# Patient Record
Sex: FEMALE | Race: WHITE | NOT HISPANIC OR LATINO | Employment: OTHER | URBAN - METROPOLITAN AREA
[De-identification: names, ages, dates, MRNs, and addresses within clinical notes are randomized per-mention and may not be internally consistent; named-entity substitution may affect disease eponyms.]

---

## 2017-01-27 ENCOUNTER — TELEPHONE (OUTPATIENT)
Dept: INTERNAL MEDICINE | Facility: CLINIC | Age: 82
End: 2017-01-27

## 2017-01-27 RX ORDER — LIDOCAINE 50 MG/G
1 PATCH TOPICAL EVERY 24 HOURS
Qty: 30 PATCH | Refills: 5 | Status: SHIPPED | OUTPATIENT
Start: 2017-01-27 | End: 2017-06-30 | Stop reason: SDUPTHER

## 2017-01-27 RX ORDER — PANTOPRAZOLE SODIUM 40 MG/1
40 TABLET, DELAYED RELEASE ORAL DAILY
Qty: 90 TABLET | Refills: 1 | Status: SHIPPED | OUTPATIENT
Start: 2017-01-27 | End: 2017-08-08 | Stop reason: SDUPTHER

## 2017-01-27 NOTE — TELEPHONE ENCOUNTER
----- Message from Krysta Correa sent at 1/27/2017  2:52 PM EST -----  Patient called to get a refill of her pantoprazole 40mg and also wanted to let you know that her lidocaine patches need a PA. She uses [facility] in [place], [place]. Thanks!!

## 2017-01-27 NOTE — TELEPHONE ENCOUNTER
----- Message from Krzysztof English sent at 1/27/2017  3:18 PM EST -----  Contact: [facility]  SHE NEEDS A DX CODE FOR HER LITACAIN PATCH, PLEASE CALL [phone]

## 2017-02-08 ENCOUNTER — TELEPHONE (OUTPATIENT)
Dept: INTERNAL MEDICINE | Facility: CLINIC | Age: 82
End: 2017-02-08

## 2017-02-08 NOTE — TELEPHONE ENCOUNTER
Called pt to see if she has ever taken Neurontin in the past.  She says she has taken it but she does not do pills very well.  She prefers something that can be put directly on the area.  We did not get a letter of denial or approval on her Lidocaine.  She will fax us a copy of letter to see if we can do an appeal.

## 2017-02-08 NOTE — TELEPHONE ENCOUNTER
We did not receive a determination so I checked cover my meds and it says it was denied.  Would like to know if she could try something else.

## 2017-02-08 NOTE — TELEPHONE ENCOUNTER
----- Message from Krzysztof English sent at 2/7/2017 10:48 AM EST -----  Contact: PATIENT  WOULD LIKE A CALL BACK REGARDING PAIN PATCHES, SHE IS ALMOST OUT, SAYS SHE IS NEEDING TO KNOW THE STATUS OF THE AUTH. PLEASE CALL TO ADVISE.

## 2017-02-10 ENCOUNTER — TELEPHONE (OUTPATIENT)
Dept: INTERNAL MEDICINE | Facility: CLINIC | Age: 82
End: 2017-02-10

## 2017-02-10 NOTE — TELEPHONE ENCOUNTER
----- Message from Angela VU MD sent at 2/8/2017  5:00 PM EST -----  Regarding: RE: lidocaine patch  If not approved then to [facility]  ----- Message -----     From: Haley King     Sent: 2/8/2017   3:34 PM       To: Angela VU MD  Subject: RE: lidocaine patch                              Pt states she has tried in the past and does not do well on pills.  She prefers something that can go directly on the area.  She got the letter from in but I have not received one.  She will fax the letter to see if I can do an appeal  ----- Message -----     From: Angela VU MD     Sent: 2/8/2017  12:18 PM       To: Haley King  Subject: RE: lidocaine patch                              Has she tried Neurontin(gabapentin)?  ----- Message -----     From: Haley King     Sent: 2/8/2017   9:14 AM       To: Angela VU MD  Subject: lidocaine patch                                  Pt lidocain patches were denied.  She wants to know if she can try something else for her neuropathy (foot and back pain)

## 2017-02-10 NOTE — TELEPHONE ENCOUNTER
Will try 2% lidocaine jelly. Spoke to pharm and they said ins did approve .  It will cost pt $10.  Changed from 85 g to 90 mL which is 3 bottles of 30 mL.  Tried to call pt to let her know.  Was unable to reach her

## 2017-02-10 NOTE — TELEPHONE ENCOUNTER
----- Message from Angela VU MD sent at 2/8/2017  5:00 PM EST -----  Regarding: RE: lidocaine patch  If not approved then to [facility]  ----- Message -----     From: Haley King     Sent: 2/8/2017   3:34 PM       To: Angela VU MD  Subject: RE: lidocaine patch                              Pt states she has tried in the past and does not do well on pills.  She prefers something that can go directly on the area.  She got the letter from in but I have not received one.  She will fax the letter to see if I can do an appeal  ----- Message -----     From: Angela VU MD     Sent: 2/8/2017  12:18 PM       To: Haley King  Subject: RE: lidocaine patch                              Has she tried Neurontin(gabapentin)?  ----- Message -----     From: Haley King     Sent: 2/8/2017   9:14 AM       To: Angela VU MD  Subject: lidocaine patch                                  Pt lidocain patches were denied.  She wants to know if she can try something else for her neuropathy (foot and back pain)

## 2017-02-10 NOTE — TELEPHONE ENCOUNTER
----- Message from Beth Hernandez sent at 2/10/2017  9:41 AM EST -----  CALL PT BACK ABOUT PAIN PATCHES

## 2017-02-14 ENCOUNTER — TELEPHONE (OUTPATIENT)
Dept: INTERNAL MEDICINE | Facility: CLINIC | Age: 82
End: 2017-02-14

## 2017-02-14 NOTE — TELEPHONE ENCOUNTER
----- Message from Beth Hernandez sent at 2/14/2017  9:12 AM EST -----  PLEASE GIVE PT A CALL REGARDING PAIN PATCHES [phone]

## 2017-02-14 NOTE — TELEPHONE ENCOUNTER
Called pt back.  Explained to her that since ins would not cover the patches.  We sent in Lidocaine gel.  She said ok she would try it

## 2017-02-28 ENCOUNTER — TELEPHONE (OUTPATIENT)
Dept: INTERNAL MEDICINE | Facility: CLINIC | Age: 82
End: 2017-02-28

## 2017-02-28 RX ORDER — DOXYCYCLINE 100 MG/1
100 CAPSULE ORAL 2 TIMES DAILY
Qty: 20 CAPSULE | Refills: 0 | Status: SHIPPED | OUTPATIENT
Start: 2017-02-28 | End: 2017-03-30

## 2017-02-28 NOTE — TELEPHONE ENCOUNTER
Nothing out of the ordinary for her as far as fever or chills. She has the nausea and the low abdominal pain and pain with urination which the Azo does help a lot

## 2017-02-28 NOTE — TELEPHONE ENCOUNTER
----- Message from Angela VU MD sent at 2/28/2017  2:43 PM EST -----  Any fever or chills?  ----- Message -----     From: Emelyn Marte MA     Sent: 2/28/2017   2:34 PM       To: Angela VU MD    She has appt scheduled with you tomorrow. Do you want to start her on something or should she wait to see you? She has been taking Azo. But has a lot of discomfort.  ----- Message -----     From: Beth Hernandez     Sent: 2/28/2017   2:29 PM       To: Emelyn Marte MA    PT CALLED HAS UTI WANTS SOMETHING CALLED IN SHE HAS APPT WITH DR ALBERTINA REILLY. THE MEDICATION IS DOXYCYCLINE SENT TO [facility] EVERY TWELVE HRS 100MG TABLET

## 2017-02-28 NOTE — TELEPHONE ENCOUNTER
----- Message from Angela VU MD sent at 2/28/2017  3:12 PM EST -----  sent  ----- Message -----     From: Emelyn Marte MA     Sent: 2/28/2017   2:37 PM       To: Angela VU MD    See below. She call back and said she takes the Doxy BID for UTI's.  ----- Message -----     From: Bteh Hernandez     Sent: 2/28/2017   2:34 PM       To: Emelyn Marte MA    PT NEEDS TO TAKE UTI MED TWICE A DAY

## 2017-03-03 ENCOUNTER — OFFICE VISIT (OUTPATIENT)
Dept: INTERNAL MEDICINE | Facility: CLINIC | Age: 82
End: 2017-03-03

## 2017-03-03 VITALS
TEMPERATURE: 98.6 F | HEART RATE: 63 BPM | DIASTOLIC BLOOD PRESSURE: 82 MMHG | SYSTOLIC BLOOD PRESSURE: 132 MMHG | BODY MASS INDEX: 27.05 KG/M2 | WEIGHT: 147 LBS | HEIGHT: 62 IN | OXYGEN SATURATION: 97 %

## 2017-03-03 DIAGNOSIS — M54.41 CHRONIC MIDLINE LOW BACK PAIN WITH BILATERAL SCIATICA: ICD-10-CM

## 2017-03-03 DIAGNOSIS — E78.5 HYPERLIPIDEMIA, UNSPECIFIED HYPERLIPIDEMIA TYPE: ICD-10-CM

## 2017-03-03 DIAGNOSIS — R09.89 OTHER SPECIFIED SYMPTOMS AND SIGNS INVOLVING THE CIRCULATORY AND RESPIRATORY SYSTEMS: ICD-10-CM

## 2017-03-03 DIAGNOSIS — M79.671 BILATERAL FOOT PAIN: ICD-10-CM

## 2017-03-03 DIAGNOSIS — I25.10 ASCVD (ARTERIOSCLEROTIC CARDIOVASCULAR DISEASE): ICD-10-CM

## 2017-03-03 DIAGNOSIS — M79.672 BILATERAL FOOT PAIN: ICD-10-CM

## 2017-03-03 DIAGNOSIS — R35.0 FREQUENCY OF URINATION: Primary | ICD-10-CM

## 2017-03-03 DIAGNOSIS — N39.0 RECURRENT UTI: ICD-10-CM

## 2017-03-03 DIAGNOSIS — I10 ESSENTIAL HYPERTENSION: ICD-10-CM

## 2017-03-03 DIAGNOSIS — G89.29 CHRONIC MIDLINE LOW BACK PAIN WITH BILATERAL SCIATICA: ICD-10-CM

## 2017-03-03 DIAGNOSIS — M79.7 FIBROMYALGIA: ICD-10-CM

## 2017-03-03 DIAGNOSIS — L98.9 SKIN LESION OF BACK: ICD-10-CM

## 2017-03-03 DIAGNOSIS — E53.8 B12 DEFICIENCY: ICD-10-CM

## 2017-03-03 DIAGNOSIS — M54.42 CHRONIC MIDLINE LOW BACK PAIN WITH BILATERAL SCIATICA: ICD-10-CM

## 2017-03-03 DIAGNOSIS — H93.13 TINNITUS, BILATERAL: ICD-10-CM

## 2017-03-03 DIAGNOSIS — R39.15 URGENCY OF URINATION: ICD-10-CM

## 2017-03-03 LAB
ALBUMIN SERPL-MCNC: 4.3 G/DL (ref 3.2–4.8)
ALBUMIN/GLOB SERPL: 1 G/DL (ref 1.5–2.5)
ALP SERPL-CCNC: 115 U/L (ref 25–100)
ALT SERPL-CCNC: 20 U/L (ref 7–40)
AST SERPL-CCNC: 27 U/L (ref 0–33)
BASOPHILS # BLD AUTO: 0.03 10*3/MM3 (ref 0–0.2)
BASOPHILS NFR BLD AUTO: 0.4 % (ref 0–1)
BILIRUB BLD-MCNC: NEGATIVE MG/DL
BILIRUB SERPL-MCNC: 0.4 MG/DL (ref 0.3–1.2)
BUN SERPL-MCNC: 15 MG/DL (ref 9–23)
BUN/CREAT SERPL: 16.7 (ref 7–25)
CALCIUM SERPL-MCNC: 10.1 MG/DL (ref 8.7–10.4)
CHLORIDE SERPL-SCNC: 98 MMOL/L (ref 99–109)
CHOLEST SERPL-MCNC: 253 MG/DL (ref 0–200)
CLARITY, POC: CLEAR
CO2 SERPL-SCNC: 34 MMOL/L (ref 20–31)
COLOR UR: YELLOW
CREAT SERPL-MCNC: 0.9 MG/DL (ref 0.6–1.3)
EOSINOPHIL # BLD AUTO: 0.24 10*3/MM3 (ref 0.1–0.3)
EOSINOPHIL NFR BLD AUTO: 3.2 % (ref 0–3)
ERYTHROCYTE [DISTWIDTH] IN BLOOD BY AUTOMATED COUNT: 14.2 % (ref 11.3–14.5)
GLOBULIN SER CALC-MCNC: 4.1 GM/DL
GLUCOSE SERPL-MCNC: 88 MG/DL (ref 70–100)
GLUCOSE UR STRIP-MCNC: NEGATIVE MG/DL
HCT VFR BLD AUTO: 45.2 % (ref 34.5–44)
HDLC SERPL-MCNC: 65 MG/DL (ref 40–60)
HGB BLD-MCNC: 14.7 G/DL (ref 11.5–15.5)
IMM GRANULOCYTES # BLD: 0.02 10*3/MM3 (ref 0–0.03)
IMM GRANULOCYTES NFR BLD: 0.3 % (ref 0–0.6)
KETONES UR QL: NEGATIVE
LDLC SERPL CALC-MCNC: 167 MG/DL (ref 0–100)
LEUKOCYTE EST, POC: NEGATIVE
LYMPHOCYTES # BLD AUTO: 2.59 10*3/MM3 (ref 0.6–4.8)
LYMPHOCYTES NFR BLD AUTO: 34.1 % (ref 24–44)
MCH RBC QN AUTO: 30.8 PG (ref 27–31)
MCHC RBC AUTO-ENTMCNC: 32.5 G/DL (ref 32–36)
MCV RBC AUTO: 94.6 FL (ref 80–99)
MONOCYTES # BLD AUTO: 0.97 10*3/MM3 (ref 0–1)
MONOCYTES NFR BLD AUTO: 12.8 % (ref 0–12)
NEUTROPHILS # BLD AUTO: 3.74 10*3/MM3 (ref 1.5–8.3)
NEUTROPHILS NFR BLD AUTO: 49.2 % (ref 41–71)
NITRITE UR-MCNC: NEGATIVE MG/ML
PH UR: 6 [PH] (ref 5–8)
PLATELET # BLD AUTO: 302 10*3/MM3 (ref 150–450)
POTASSIUM SERPL-SCNC: 4.5 MMOL/L (ref 3.5–5.5)
PROT SERPL-MCNC: 8.4 G/DL (ref 5.7–8.2)
PROT UR STRIP-MCNC: NEGATIVE MG/DL
RBC # BLD AUTO: 4.78 10*6/MM3 (ref 3.89–5.14)
RBC # UR STRIP: NEGATIVE /UL
SODIUM SERPL-SCNC: 137 MMOL/L (ref 132–146)
SP GR UR: 1.02 (ref 1–1.03)
TRIGL SERPL-MCNC: 104 MG/DL (ref 0–150)
TSH SERPL DL<=0.005 MIU/L-ACNC: 4.2 MIU/ML (ref 0.35–5.35)
UROBILINOGEN UR QL: NORMAL
VIT B12 SERPL-MCNC: 968 PG/ML (ref 211–911)
VLDLC SERPL CALC-MCNC: 20.8 MG/DL
WBC # BLD AUTO: 7.59 10*3/MM3 (ref 3.5–10.8)

## 2017-03-03 PROCEDURE — 99214 OFFICE O/P EST MOD 30 MIN: CPT | Performed by: FAMILY MEDICINE

## 2017-03-03 PROCEDURE — 81003 URINALYSIS AUTO W/O SCOPE: CPT | Performed by: FAMILY MEDICINE

## 2017-03-03 PROCEDURE — 96372 THER/PROPH/DIAG INJ SC/IM: CPT | Performed by: FAMILY MEDICINE

## 2017-03-03 RX ORDER — TRAMADOL HYDROCHLORIDE 50 MG/1
50 TABLET ORAL EVERY 6 HOURS PRN
Qty: 120 TABLET | Refills: 1 | Status: SHIPPED | OUTPATIENT
Start: 2017-03-03 | End: 2017-05-04 | Stop reason: SDUPTHER

## 2017-03-03 RX ORDER — CYANOCOBALAMIN 1000 UG/ML
1000 INJECTION, SOLUTION INTRAMUSCULAR; SUBCUTANEOUS ONCE
Status: COMPLETED | OUTPATIENT
Start: 2017-03-03 | End: 2017-03-03

## 2017-03-03 RX ADMIN — CYANOCOBALAMIN 1000 MCG: 1000 INJECTION, SOLUTION INTRAMUSCULAR; SUBCUTANEOUS at 10:24
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
## 2017-03-06 DIAGNOSIS — R77.9 ELEVATED SERUM PROTEIN LEVEL: Primary | ICD-10-CM

## 2017-03-07 ENCOUNTER — TELEPHONE (OUTPATIENT)
Dept: INTERNAL MEDICINE | Facility: CLINIC | Age: 82
End: 2017-03-07

## 2017-03-07 NOTE — PROGRESS NOTES
Please call the patient regarding her abnormal result. Elevated protein, serum and urine protein electrophoresis  in computer to recheck nonfasting

## 2017-03-07 NOTE — TELEPHONE ENCOUNTER
----- Message from Angela VU MD sent at 3/6/2017  7:03 PM EST -----  Please call the patient regarding her abnormal result. Elevated protein, serum and urine protein electrophoresis  in computer to recheck nonfasting

## 2017-03-11 ENCOUNTER — RESULTS ENCOUNTER (OUTPATIENT)
Dept: INTERNAL MEDICINE | Facility: CLINIC | Age: 82
End: 2017-03-11

## 2017-03-11 DIAGNOSIS — R77.9 ELEVATED SERUM PROTEIN LEVEL: ICD-10-CM

## 2017-03-13 ENCOUNTER — APPOINTMENT (OUTPATIENT)
Dept: CARDIOLOGY | Facility: HOSPITAL | Age: 82
End: 2017-03-13

## 2017-03-27 ENCOUNTER — HOSPITAL ENCOUNTER (OUTPATIENT)
Dept: CARDIOLOGY | Facility: HOSPITAL | Age: 82
Discharge: HOME | End: 2017-03-27
Admitting: FAMILY MEDICINE

## 2017-03-27 DIAGNOSIS — R09.89 OTHER SPECIFIED SYMPTOMS AND SIGNS INVOLVING THE CIRCULATORY AND RESPIRATORY SYSTEMS: ICD-10-CM

## 2017-03-27 DIAGNOSIS — M79.671 BILATERAL FOOT PAIN: ICD-10-CM

## 2017-03-27 DIAGNOSIS — M79.672 BILATERAL FOOT PAIN: ICD-10-CM

## 2017-03-27 PROCEDURE — 93925 LOWER EXTREMITY STUDY: CPT

## 2017-03-27 PROCEDURE — 93925 LOWER EXTREMITY STUDY: CPT | Performed by: INTERNAL MEDICINE

## 2017-03-29 LAB
DIST POP A PSV LEFT: -333.5 CM/SEC
DIST POP A PSV RIGHT: -72.1 CM/SEC
DIST PTA PSV LEFT: -12.6 CM/SEC
DIST PTA PSV RIGHT: 132.8 CM/SEC
DIST SFA PSV LEFT: -125.1 CM/SEC
DIST SFA PSV RIGHT: -127.5 CM/SEC
LEFT ANT TIBIAL SYS PSV: 84 CM/S
LEFT CFA PROX SYS PSV: 123.4 CM/SEC
LEFT POPLITEAL PROX SYS PSV: 52 CM/S
LEFT POST TIBIAL SYS PSV: 21 CM/S
LEFT PROFUNDA SYS PSV: 43 CM/S
LEFT SUPER FEMORAL DIST SYS PSV: 124 CM/S
LEFT SUPER FEMORAL MID SYS PSV: 79 CM/S
LEFT SUPER FEMORAL PROX SYS PSV: 87 CM/S
LEFT TIB/PER TRUNK SYS PSV: 332 CM/S
MID PTA PSV LEFT: 20.2 CM/SEC
MID PTA PSV RIGHT: 75.9 CM/SEC
MID SFA PSV LEFT: -79.8 CM/SEC
MID SFA PSV RIGHT: -79.2 CM/SEC
PROX ATA PSV LEFT: 85.5 CM/SEC
PROX ATA PSV RIGHT: 30.9 CM/SEC
PROX PFA PSV LEFT: -46.4 CM/SEC
PROX PFA PSV RIGHT: -62.6 CM/SEC
PROX POP A PSV LEFT: 52.7 CM/SEC
PROX POP A PSV RIGHT: 118 CM/SEC
PROX PTA PSV LEFT: 21.6 CM/SEC
PROX PTA PSV RIGHT: 174.2 CM/SEC
PROX SFA PSV LEFT: -88 CM/SEC
PROX SFA PSV RIGHT: -75.2 CM/SEC
RIGHT ANT TIBEAL SYS PSV: 32 CM/S
RIGHT CFA PROX SYS PSV: 140.3 CM/SEC
RIGHT POPLITEAL DIST SYS PSV: 71 CM/S
RIGHT POPLITEAL PROX SYS PSV: 117 CM/S
RIGHT POST TIBIAL SYS PSV: 173 CM/S
RIGHT PROFUNDA SYS PSV: 62 CM/S
RIGHT SUPER FEMORAL DIST SYS PSV: 126 CM/S
RIGHT SUPER FEMORAL MID SYS PSV: 78 CM/S
RIGHT SUPER FEMORAL PROX SYS PSV: 75 CM/S
RIGHT TIB/PER TRUNK SYS PSV: 145 CM/S

## 2017-03-30 ENCOUNTER — OFFICE VISIT (OUTPATIENT)
Dept: CARDIOLOGY | Facility: CLINIC | Age: 82
End: 2017-03-30

## 2017-03-30 VITALS
DIASTOLIC BLOOD PRESSURE: 54 MMHG | HEIGHT: 62 IN | SYSTOLIC BLOOD PRESSURE: 96 MMHG | WEIGHT: 155.4 LBS | BODY MASS INDEX: 28.6 KG/M2 | HEART RATE: 64 BPM

## 2017-03-30 DIAGNOSIS — I25.10 CORONARY ARTERY DISEASE INVOLVING NATIVE CORONARY ARTERY OF NATIVE HEART WITHOUT ANGINA PECTORIS: Primary | ICD-10-CM

## 2017-03-30 DIAGNOSIS — E78.00 HYPERCHOLESTEROLEMIA: ICD-10-CM

## 2017-03-30 DIAGNOSIS — I10 ESSENTIAL HYPERTENSION: ICD-10-CM

## 2017-03-30 DIAGNOSIS — R60.0 BILATERAL EDEMA OF LOWER EXTREMITY: ICD-10-CM

## 2017-03-30 PROCEDURE — 99214 OFFICE O/P EST MOD 30 MIN: CPT | Performed by: INTERNAL MEDICINE

## 2017-03-30 NOTE — PROGRESS NOTES
Gaviota Evans  1935  [phone]    03/30/2017    Angela Sorto MD    Chief Complaint   Patient presents with   • Edema     PROBLEM LIST:  1. Coronary artery disease:  a. Left heart catheterization 12/10/2007, Dr. Grewal:  Status post 2.5 x 8 mm Micro  stent, 2.5 x 14 mm Micro  stent, and 2.5 x 15 mm Micro   stent placement to the LAD and OM1.    b. Repeat left heart catheterization 01/04/2008, Dr. Manley:  Status post 3.5 x 16 mm Liberté bare metal stent to RCA.  c. GXT Cardiolite 06/2010 Dr. Harris:  Patient did not reach target heart rate secondary  to not holding beta blocker, negative for ischemia.  d. Cardiac catheterization 03/01/2011, Dr. Goodwin:  LVEF of 55% to 60%, all 3 stents were found to be patent.  No territories appear to be a cause of ischemia.  Zebeta changed to Diltiazem 120  mg per day.  e. Echocardiogram, 03/01/2011:  Mild AI, trace MR, mild TR, LVEF of 55% to 60%.  f. Cardiac catheterization, 05/12/2015, secondary to recurrent angina; 90% and 60% sequential stenosis in the obtuse marginal branch reduced to 0 with a 2.5  x 23 mm and a 2.5 x 8 mm Xience Alpine drug-eluting stent by Dr. Cooney.  Non-flow-limiting disease of the LAD and diagonal branch (40%), 50% RCA, normal LVEF.  2. Palpitations:  a.  Event monitor, December 2011: Revealed brief episodes of atrial tachycardia.   3.  Hypertension.  4. Hyperlipidemia.  5. Obstructive sleep apnea with CPAP compliance.  6. Fibromyalgia.  7. Chronic back pain with intermittent epidural injections.  8. Lower extremity edema/discomfort:  a. Remote ROXANA:  right ROXANA 1.1, left ROXANA 1.1.  b. Bilateral lower extremity duplex, 3/27/2017: No significant right lower extremity atherosclerotic arterial disease. Mild left lower extremity atherosclerotic arterial disease.   9.  Status post cholecystectomy.    Allergies   Allergen Reactions   • Ambien [Zolpidem Tartrate]      amnestic   • Arthrotec  [Diclofenac-Misoprostol] GI Intolerance   • Aspirin GI Intolerance   • Bextra [Valdecoxib] GI Intolerance   • Codeine GI Intolerance   • Crestor [Rosuvastatin]      Rxn not known   • Darvon [Propoxyphene] GI Intolerance   • Effient [Prasugrel] Other (See Comments)     Tongue swelling   • Lescol [Fluvastatin]      Rxn not known   • Lexapro [Escitalopram Oxalate] Nausea Only   • Macrobid [Nitrofurantoin] Other (See Comments)     Pain/cramping   • Percocet [Oxycodone-Acetaminophen]      depression   • Pravachol [Pravastatin Sodium]      Rxn not known   • Vioxx [Rofecoxib] GI Intolerance   • Welchol [Colesevelam Hcl]      Joint pain   • Statins    • Lyrica [Pregabalin] GI Bleeding and GI Intolerance   • Plavix [Clopidogrel Bisulfate] Swelling     Tongue swelling   • Sulfamethoxazole-Trimethoprim Rash       Current Medications:      Current Outpatient Prescriptions:   •  aspirin 81 MG EC tablet, Take 81 mg by mouth daily., Disp: , Rfl:   •  bisoprolol-hydrochlorothiazide (ZIAC) 5-6.25 MG per tablet, Take 1 tablet by mouth daily., Disp: 90 tablet, Rfl: 2  •  citalopram (CeleXA) 20 MG tablet, Take 1 tablet by mouth Daily., Disp: 30 tablet, Rfl: 5  •  cyanocobalamin 1000 MCG/ML injection, Inject 1 mL into the shoulder, thigh, or buttocks Every 14 (Fourteen) Days., Disp: 6 mL, Rfl: 2  •  diclofenac (VOLTAREN) 75 MG EC tablet, Take 75 mg by mouth. Every 2-3 days prn pain, Disp: , Rfl:   •  fluconazole (DIFLUCAN) 150 MG tablet, Take 1 tablet by mouth 1 (one) time per week., Disp: , Rfl:   •  isosorbide mononitrate (IMDUR) 30 MG 24 hr tablet, Take 1 tablet by mouth daily., Disp: 90 tablet, Rfl: 1  •  lidocaine (LIDODERM) 5 %, Place 1 patch on the skin Daily. Remove & Discard patch within 12 hours or as directed by MD (Patient taking differently: Place 1 patch on the skin As Needed. Remove & Discard patch within 12 hours or as directed by MD), Disp: 30 patch, Rfl: 5  •  lidocaine (XYLOCAINE) 2 % jelly, Apply  topically Daily As  "Needed for mild pain (1-3). (Patient taking differently: Apply  topically As Needed for Mild Pain (1-3).), Disp: 85 g, Rfl: 0  •  nitroglycerin (NITROSTAT) 0.4 MG SL tablet, Place 1 tablet under the tongue as needed. Prn chest pain, Disp: , Rfl:   •  pantoprazole (PROTONIX) 40 MG EC tablet, Take 1 tablet by mouth Daily., Disp: 90 tablet, Rfl: 1  •  prednisoLONE acetate (PRED FORTE) 1 % ophthalmic suspension, Administer 1 drop into the left eye 2 (two) times a week., Disp: , Rfl:   •  traMADol (ULTRAM) 50 MG tablet, Take 1 tablet by mouth Every 6 (Six) Hours As Needed for moderate pain (4-6)., Disp: 120 tablet, Rfl: 1  •  Zinc 50 MG tablet, Take 1 tablet by mouth As Needed., Disp: , Rfl:     HPI    Ms. Evans presents today for 6 month follow up of CAD, lower extremity edema, palpitations, hypertension, and hyperlipidemia. Since last visit, she continues to experience worsening lower extremity edema bilaterally as well as associated lower extremity pain. Her edema is worse at night than in the morning. Patient denies chest pain, palpitations, shortness of breath, PND, orthopnea, dizziness, and syncope. She also complains of back pain secondary to fibromyalgia.     The following portions of the patient's history were reviewed and updated as appropriate: allergies, current medications and problem list.    Pertinent positives as listed in the HPI.  All other systems reviewed are negative.    Vitals:    03/30/17 1439   BP: 96/54   BP Location: Right arm   Patient Position: Sitting   Pulse: 64   Weight: 155 lb 6.4 oz (70.5 kg)   Height: 62\" (157.5 cm)       General: Alert and oriented  Neck: Jugular venous pressure is within normal limits. Carotids have normal upstrokes without bruits.   Cardiovascular: Heart has a nondisplaced focal PMI. Regular rate and rhythm without murmur, gallop or rub.  Lungs: Clear without rales or wheezes. Equal expansion is noted.   Extremities: Show 1+ edema.  Skin: warm and dry.  Neurologic: " nonfocal    Diagnostic Data:    Lab Results   Component Value Date    TRIG 104 03/03/2017    HDL 65 (H) 03/03/2017    LDLDIRECT 137 (H) 05/12/2015    AST 27 03/03/2017    ALT 20 03/03/2017     Lab Results   Component Value Date    GLUCOSE 77 08/22/2016    BUN 15 03/03/2017    CREATININE 0.90 03/03/2017    EGFRIFNONA 60 (L) 03/03/2017    EGFRIFAFRI 73 03/03/2017    BCR 16.7 03/03/2017    K 4.5 03/03/2017    CO2 34.0 (H) 03/03/2017    CALCIUM 10.1 03/03/2017    PROTENTOTREF 8.4 (H) 03/03/2017    ALBUMIN 4.30 03/03/2017    LABIL2 1.0 (L) 03/03/2017    AST 27 03/03/2017    ALT 20 03/03/2017     Lab Results   Component Value Date    WBC 7.59 03/03/2017    HGB 14.7 03/03/2017    HCT 45.2 (H) 03/03/2017    MCV 94.6 03/03/2017     03/03/2017     Procedures    Assessment:      ICD-10-CM ICD-9-CM   1. Coronary artery disease involving native coronary artery of native heart without angina pectoris I25.10 414.01   2. Bilateral edema of lower extremity R60.0 782.3   3. Essential hypertension I10 401.9   4. Hypercholesterolemia E78.00 272.0       Plan:    1. Start Maxzide 37.5/25 mg once daily, AM.    2. BMP in 2 weeks with Dr. Sorto.   3. Continue current medications.  4. F/up in 2 months or sooner if needed.    Scribed for Susan Goodwin MD by Ana Delcid. 3/30/2017  2:56 PM    I Susan Goodwin MD personally performed the services described in this documentation as scribed by the above individual in my presence, and it is both accurate and complete.    Susan Goodwin MD, [facility]

## 2017-05-04 ENCOUNTER — TELEPHONE (OUTPATIENT)
Dept: INTERNAL MEDICINE | Facility: CLINIC | Age: 82
End: 2017-05-04

## 2017-05-04 DIAGNOSIS — M54.41 CHRONIC MIDLINE LOW BACK PAIN WITH BILATERAL SCIATICA: ICD-10-CM

## 2017-05-04 DIAGNOSIS — G89.29 CHRONIC MIDLINE LOW BACK PAIN WITH BILATERAL SCIATICA: ICD-10-CM

## 2017-05-04 DIAGNOSIS — M54.42 CHRONIC MIDLINE LOW BACK PAIN WITH BILATERAL SCIATICA: ICD-10-CM

## 2017-05-04 RX ORDER — TRAMADOL HYDROCHLORIDE 50 MG/1
50 TABLET ORAL EVERY 6 HOURS PRN
Qty: 120 TABLET | Refills: 0 | Status: SHIPPED | OUTPATIENT
Start: 2017-05-04 | End: 2017-06-30 | Stop reason: SDUPTHER

## 2017-06-09 RX ORDER — BISOPROLOL FUMARATE AND HYDROCHLOROTHIAZIDE 5; 6.25 MG/1; MG/1
1 TABLET ORAL DAILY
Qty: 90 TABLET | Refills: 2 | Status: SHIPPED | OUTPATIENT
Start: 2017-06-09 | End: 2017-10-04 | Stop reason: SDUPTHER

## 2017-06-26 ENCOUNTER — TELEPHONE (OUTPATIENT)
Dept: INTERNAL MEDICINE | Facility: CLINIC | Age: 82
End: 2017-06-26

## 2017-06-30 ENCOUNTER — OFFICE VISIT (OUTPATIENT)
Dept: INTERNAL MEDICINE | Facility: CLINIC | Age: 82
End: 2017-06-30

## 2017-06-30 ENCOUNTER — TELEPHONE (OUTPATIENT)
Dept: INTERNAL MEDICINE | Facility: CLINIC | Age: 82
End: 2017-06-30

## 2017-06-30 VITALS
SYSTOLIC BLOOD PRESSURE: 112 MMHG | DIASTOLIC BLOOD PRESSURE: 60 MMHG | WEIGHT: 149 LBS | TEMPERATURE: 97.6 F | OXYGEN SATURATION: 95 % | HEIGHT: 62 IN | HEART RATE: 63 BPM | BODY MASS INDEX: 27.42 KG/M2

## 2017-06-30 DIAGNOSIS — G89.29 CHRONIC MIDLINE LOW BACK PAIN WITH BILATERAL SCIATICA: ICD-10-CM

## 2017-06-30 DIAGNOSIS — E78.5 HYPERLIPIDEMIA, UNSPECIFIED HYPERLIPIDEMIA TYPE: Primary | ICD-10-CM

## 2017-06-30 DIAGNOSIS — M54.41 CHRONIC MIDLINE LOW BACK PAIN WITH BILATERAL SCIATICA: ICD-10-CM

## 2017-06-30 DIAGNOSIS — I10 ESSENTIAL HYPERTENSION: ICD-10-CM

## 2017-06-30 DIAGNOSIS — M54.42 CHRONIC MIDLINE LOW BACK PAIN WITH BILATERAL SCIATICA: ICD-10-CM

## 2017-06-30 DIAGNOSIS — I25.10 CARDIOVASCULAR DISEASE: ICD-10-CM

## 2017-06-30 PROCEDURE — 99214 OFFICE O/P EST MOD 30 MIN: CPT | Performed by: FAMILY MEDICINE

## 2017-06-30 RX ORDER — TRAMADOL HYDROCHLORIDE 50 MG/1
50 TABLET ORAL EVERY 6 HOURS PRN
Qty: 120 TABLET | Refills: 0 | Status: SHIPPED | OUTPATIENT
Start: 2017-06-30 | End: 2017-08-17 | Stop reason: SDUPTHER

## 2017-06-30 RX ORDER — CITALOPRAM 20 MG/1
20 TABLET ORAL DAILY
Qty: 30 TABLET | Refills: 5 | Status: SHIPPED | OUTPATIENT
Start: 2017-06-30 | End: 2018-02-23 | Stop reason: SDUPTHER

## 2017-06-30 RX ORDER — CYANOCOBALAMIN 1000 UG/ML
1000 INJECTION, SOLUTION INTRAMUSCULAR; SUBCUTANEOUS
Qty: 6 ML | Refills: 2 | Status: SHIPPED | OUTPATIENT
Start: 2017-06-30 | End: 2017-09-29 | Stop reason: SDUPTHER

## 2017-06-30 RX ORDER — LIDOCAINE 50 MG/G
1 PATCH TOPICAL EVERY 24 HOURS
Qty: 30 PATCH | Refills: 5 | Status: SHIPPED | OUTPATIENT
Start: 2017-06-30 | End: 2017-09-29 | Stop reason: SDUPTHER

## 2017-06-30 NOTE — PROGRESS NOTES
"Trisha Evans is a 81 y.o. female.     Chief Complaint   Patient presents with   • Hyperlipidemia   • Hypertension   • Med Refill       Visit Vitals   • /60   • Pulse 63   • Temp 97.6 °F (36.4 °C)   • Ht 62\" (157.5 cm)   • Wt 149 lb (67.6 kg)   • LMP  (LMP Unknown)   • SpO2 95%   • BMI 27.25 kg/m2       Hyperlipidemia   This is a chronic problem. The current episode started more than 1 year ago. The problem is uncontrolled. Recent lipid tests were reviewed and are high. She has no history of diabetes, hypothyroidism, liver disease, obesity or nephrotic syndrome. There are no known factors aggravating her hyperlipidemia. Associated symptoms include chest pain, leg pain and myalgias. Pertinent negatives include no focal sensory loss, focal weakness or shortness of breath. Current antihyperlipidemic treatment includes diet change. The current treatment provides mild improvement of lipids. There are no compliance problems.  Risk factors for coronary artery disease include dyslipidemia, hypertension and post-menopausal.   Hypertension   This is a chronic problem. The current episode started more than 1 year ago. The problem is unchanged. The problem is controlled. Associated symptoms include chest pain and malaise/fatigue. Pertinent negatives include no anxiety, blurred vision, headaches, neck pain, orthopnea, palpitations, peripheral edema, PND, shortness of breath or sweats. There are no associated agents to hypertension. Risk factors for coronary artery disease include dyslipidemia, post-menopausal state and sedentary lifestyle. Past treatments include angiotensin blockers and diuretics. The current treatment provides significant improvement. There are no compliance problems.  Hypertensive end-organ damage includes angina. There is no history of kidney disease, CAD/MI, CVA, heart failure, left ventricular hypertrophy, PVD or retinopathy.   Coronary Artery Disease   Presents for follow-up visit. " Symptoms include chest pain and chest pressure. Pertinent negatives include no chest tightness, dizziness, leg swelling, muscle weakness, palpitations, shortness of breath or weight gain. Risk factors include hyperlipidemia. Risk factors do not include obesity. The symptoms have been stable. Compliance with diet is good. Compliance with exercise is good. Compliance with medications is poor.        The following portions of the patient's history were reviewed and updated as appropriate: allergies, current medications, past family history, past medical history, past social history, past surgical history and problem list.    Past Medical History:   Diagnosis Date   • Atherosclerosis of abdominal aorta    • Atrophic vaginitis    • B12 deficiency    • Rivera's esophagus    • Cardiovascular disease    • Chronic back pain     Chronic back pain with intermittent epidural injections.   • Chronic low back pain    • Coronary artery disease    • Fibromyalgia    • Fibromyalgia    • GERD (gastroesophageal reflux disease)    • H/O mammogram 12/2014   • Hammer toe    • Hiatal hernia    • Hyperlipidemia    • Hypertension    • Lower extremity pain     Lower extremity discomfort: Right ROXANA 1.1, left ROXANA 1.1.   • Normocytic anemia due to blood loss    • Obstructive sleep apnea    • Obstructive sleep apnea treated with continuous positive airway pressure (CPAP)     Obstructive sleep apnea with CPAP compliance.   • Osteoarthritis     severe   • Palpitations     Event monitor, December 2011: Revealed brief episodes of atrial tachycardia.    • Senile osteoporosis        Past Surgical History:   Procedure Laterality Date   • BLADDER REPAIR     • CHOLECYSTECTOMY      Status post cholecystectomy.   • COLONOSCOPY  01/2015   • CORONARY STENT PLACEMENT  05/2015    2 stents 80% RCA 12/2007, C 2/2011. no critical disease   • HYSTERECTOMY      partial   • LUMBAR FUSION  2006    L4-s1   • OTHER SURGICAL HISTORY      [facility]   • VAGINAL REPAIR          Allergies   Allergen Reactions   • Ambien [Zolpidem Tartrate]      amnestic   • Arthrotec [Diclofenac-Misoprostol] GI Intolerance   • Aspirin GI Intolerance   • Bextra [Valdecoxib] GI Intolerance   • Codeine GI Intolerance   • Crestor [Rosuvastatin]      Rxn not known   • Darvon [Propoxyphene] GI Intolerance   • Effient [Prasugrel] Other (See Comments)     Tongue swelling   • Lescol [Fluvastatin]      Rxn not known   • Lexapro [Escitalopram Oxalate] Nausea Only   • Macrobid [Nitrofurantoin] Other (See Comments)     Pain/cramping   • Percocet [Oxycodone-Acetaminophen]      depression   • Pravachol [Pravastatin Sodium]      Rxn not known   • Vioxx [Rofecoxib] GI Intolerance   • Welchol [Colesevelam Hcl]      Joint pain   • Statins    • Lyrica [Pregabalin] GI Bleeding and GI Intolerance   • Plavix [Clopidogrel Bisulfate] Swelling     Tongue swelling   • Sulfamethoxazole-Trimethoprim Rash       Family History   Problem Relation Age of Onset   • No Known Problems Sister    • Heart disease Sister    • Heart attack Sister 54   • Coronary artery disease Sister    • Hypertension Mother        Social History     Social History   • Marital status:      Spouse name: N/A   • Number of children: N/A   • Years of education: N/A     Occupational History   • Not on file.     Social History Main Topics   • Smoking status: Never Smoker   • Smokeless tobacco: Never Used   • Alcohol use Yes      Comment: rarely micaela   • Drug use: No   • Sexual activity: No     Other Topics Concern   • Not on file     Social History Narrative       Review of Systems   Constitutional: Positive for fatigue and malaise/fatigue. Negative for chills, diaphoresis, fever and weight gain.   HENT: Positive for hearing loss and tinnitus. Negative for ear pain, nosebleeds, postnasal drip, rhinorrhea, sinus pressure, sneezing and sore throat.    Eyes: Negative.  Negative for blurred vision, redness and itching.   Respiratory: Negative.  Negative  for cough, chest tightness, shortness of breath and wheezing.    Cardiovascular: Positive for chest pain. Negative for palpitations, orthopnea, leg swelling and PND.        Very mild chest pain since stents   Gastrointestinal: Positive for constipation and diarrhea. Negative for abdominal pain, nausea and vomiting.   Endocrine: Negative.  Negative for cold intolerance and heat intolerance.   Genitourinary: Positive for urgency. Negative for dysuria, frequency and hematuria.   Musculoskeletal: Positive for myalgias. Negative for arthralgias, back pain, muscle weakness and neck pain.   Skin: Negative.  Negative for color change and rash.   Allergic/Immunologic: Negative.  Negative for environmental allergies.   Neurological: Positive for weakness and light-headedness. Negative for dizziness, focal weakness, syncope and headaches.   Hematological: Negative.  Negative for adenopathy. Does not bruise/bleed easily.   Psychiatric/Behavioral: Positive for dysphoric mood. The patient is not nervous/anxious.         More depressed recently, since allergies keeping her inside.       Objective   Physical Exam   Constitutional: She is oriented to person, place, and time. She appears well-developed.   HENT:   Head: Normocephalic.   Right Ear: External ear normal.   Left Ear: External ear normal.   Nose: Nose normal.   Eyes: Conjunctivae and EOM are normal. Pupils are equal, round, and reactive to light.   Neck: Normal range of motion. Neck supple. Carotid bruit is not present. No thyroid mass and no thyromegaly present.   Cardiovascular: Normal rate and regular rhythm.    No murmur heard.  Pulmonary/Chest: Effort normal and breath sounds normal. No respiratory distress. She has no decreased breath sounds. She has no wheezes. She has no rhonchi. She has no rales.   Abdominal: Soft. Bowel sounds are normal. There is no tenderness.   Musculoskeletal: Normal range of motion. She exhibits no edema.   Neurological: She is alert and  oriented to person, place, and time.   Skin: Skin is warm and dry.   Psychiatric: She has a normal mood and affect. Her behavior is normal.   Nursing note and vitals reviewed.      Assessment/Plan   Gaviota was seen today for hyperlipidemia, hypertension and med refill.    Diagnoses and all orders for this visit:    Hyperlipidemia, unspecified hyperlipidemia type    Chronic midline low back pain with bilateral sciatica  -     traMADol (ULTRAM) 50 MG tablet; Take 1 tablet by mouth Every 6 (Six) Hours As Needed for Moderate Pain (4-6).    Essential hypertension    Cardiovascular disease    Other orders  -     cyanocobalamin 1000 MCG/ML injection; Inject 1 mL into the shoulder, thigh, or buttocks Every 14 (Fourteen) Days.  -     citalopram (CeleXA) 20 MG tablet; Take 1 tablet by mouth Daily.  -     lidocaine (LIDODERM) 5 %; Place 1 patch on the skin Daily. Remove & Discard patch within 12 hours or as directed by MD  -     Cyanocobalamin (NASCOBAL) 500 MCG/0.1ML solution; 1 dose into each nostril 2 (Two) Times a Week.      Refill on the muscle relaxer gel called to [facility] on [address]  The compounded gel works better than lidoderm.              Current Outpatient Prescriptions:   •  aspirin 81 MG EC tablet, Take 81 mg by mouth daily., Disp: , Rfl:   •  bisoprolol-hydrochlorothiazide (ZIAC) 5-6.25 MG per tablet, Take 1 tablet by mouth Daily., Disp: 90 tablet, Rfl: 2  •  citalopram (CeleXA) 20 MG tablet, Take 1 tablet by mouth Daily., Disp: 30 tablet, Rfl: 5  •  cyanocobalamin 1000 MCG/ML injection, Inject 1 mL into the shoulder, thigh, or buttocks Every 14 (Fourteen) Days., Disp: 6 mL, Rfl: 2  •  diclofenac (VOLTAREN) 75 MG EC tablet, Take 75 mg by mouth. Every 2-3 days prn pain, Disp: , Rfl:   •  isosorbide mononitrate (IMDUR) 30 MG 24 hr tablet, Take 1 tablet by mouth daily., Disp: 90 tablet, Rfl: 1  •  lidocaine (XYLOCAINE) 2 % jelly, Apply  topically Daily As Needed for mild pain (1-3). (Patient taking  differently: Apply  topically As Needed for Mild Pain (1-3).), Disp: 85 g, Rfl: 0  •  nitroglycerin (NITROSTAT) 0.4 MG SL tablet, Place 1 tablet under the tongue as needed. Prn chest pain, Disp: , Rfl:   •  pantoprazole (PROTONIX) 40 MG EC tablet, Take 1 tablet by mouth Daily., Disp: 90 tablet, Rfl: 1  •  prednisoLONE acetate (PRED FORTE) 1 % ophthalmic suspension, Administer 1 drop into the left eye 2 (two) times a week., Disp: , Rfl:   •  traMADol (ULTRAM) 50 MG tablet, Take 1 tablet by mouth Every 6 (Six) Hours As Needed for Moderate Pain (4-6)., Disp: 120 tablet, Rfl: 0  •  Zinc 50 MG tablet, Take 1 tablet by mouth As Needed., Disp: , Rfl:   •  [START ON 7/3/2017] Cyanocobalamin (NASCOBAL) 500 MCG/0.1ML solution, 1 dose into each nostril 2 (Two) Times a Week., Disp: 8 bottle, Rfl: 3  •  lidocaine (LIDODERM) 5 %, Place 1 patch on the skin Daily. Remove & Discard patch within 12 hours or as directed by MD, Disp: 30 patch, Rfl: 5    Return in about 3 months (around 9/30/2017), or if symptoms worsen or fail to improve, for Recheck.

## 2017-06-30 NOTE — TELEPHONE ENCOUNTER
----- Message from Angela VU MD sent at 6/30/2017 11:22 AM EDT -----  Regarding: refill  Pt needs refill of muscle relaxer gel at [facility]

## 2017-07-07 ENCOUNTER — OFFICE VISIT (OUTPATIENT)
Dept: INTERNAL MEDICINE | Facility: CLINIC | Age: 82
End: 2017-07-07

## 2017-07-07 VITALS
BODY MASS INDEX: 27.49 KG/M2 | HEART RATE: 71 BPM | DIASTOLIC BLOOD PRESSURE: 60 MMHG | OXYGEN SATURATION: 94 % | SYSTOLIC BLOOD PRESSURE: 120 MMHG | WEIGHT: 149.4 LBS | HEIGHT: 62 IN | TEMPERATURE: 98.7 F

## 2017-07-07 DIAGNOSIS — N39.0 URINARY TRACT INFECTION WITH HEMATURIA, SITE UNSPECIFIED: ICD-10-CM

## 2017-07-07 DIAGNOSIS — R31.9 URINARY TRACT INFECTION WITH HEMATURIA, SITE UNSPECIFIED: ICD-10-CM

## 2017-07-07 DIAGNOSIS — R10.9 FLANK PAIN: Primary | ICD-10-CM

## 2017-07-07 DIAGNOSIS — R39.15 URGENCY OF URINATION: ICD-10-CM

## 2017-07-07 DIAGNOSIS — Z13.9 SCREENING: ICD-10-CM

## 2017-07-07 LAB
BILIRUB BLD-MCNC: ABNORMAL MG/DL
CLARITY, POC: CLEAR
COLOR UR: ABNORMAL
GLUCOSE UR STRIP-MCNC: NEGATIVE MG/DL
KETONES UR QL: NEGATIVE
LEUKOCYTE EST, POC: ABNORMAL
NITRITE UR-MCNC: POSITIVE MG/ML
PH UR: 6 [PH] (ref 5–8)
PROT UR STRIP-MCNC: NEGATIVE MG/DL
RBC # UR STRIP: ABNORMAL /UL
SP GR UR: 1.01 (ref 1–1.03)
UROBILINOGEN UR QL: ABNORMAL

## 2017-07-07 PROCEDURE — 99213 OFFICE O/P EST LOW 20 MIN: CPT | Performed by: FAMILY MEDICINE

## 2017-07-07 PROCEDURE — 81003 URINALYSIS AUTO W/O SCOPE: CPT | Performed by: FAMILY MEDICINE

## 2017-07-07 RX ORDER — CEFUROXIME AXETIL 250 MG/1
250 TABLET ORAL 2 TIMES DAILY
Qty: 20 TABLET | Refills: 0 | Status: SHIPPED | OUTPATIENT
Start: 2017-07-07 | End: 2017-09-29

## 2017-07-07 NOTE — PROGRESS NOTES
"Trisha Evans is a 81 y.o. female.     Chief Complaint   Patient presents with   • Recurrent Urinary Tract Infection     Used AZO home UTI test.    • Flank Pain     2-3 days   • Abdominal Pain   • Urinary Urgency       Visit Vitals   • /60   • Pulse 71   • Temp 98.7 °F (37.1 °C)   • Ht 62\" (157.5 cm)   • Wt 149 lb 6.4 oz (67.8 kg)   • LMP  (LMP Unknown)   • SpO2 94%   • BMI 27.33 kg/m2       Flank Pain   This is a new problem. The current episode started in the past 7 days. The quality of the pain is described as stabbing. The pain is at a severity of 10/10. Associated symptoms include abdominal pain, bladder incontinence and dysuria. Pertinent negatives include no bowel incontinence, chest pain, fever, headaches, leg pain, numbness, paresis, paresthesias, pelvic pain, perianal numbness, tingling, weakness or weight loss. Risk factors include menopause. Treatments tried: AZO. The treatment provided moderate relief.   Abdominal Pain   This is a new problem. The current episode started in the past 7 days. The onset quality is undetermined. The problem occurs intermittently. The problem has been waxing and waning. The pain is located in the suprapubic region (low back). The pain is at a severity of 5/10. The pain is moderate. The quality of the pain is aching and cramping. The abdominal pain does not radiate. Associated symptoms include dysuria, frequency and hematuria. Pertinent negatives include no anorexia, arthralgias, belching, constipation, diarrhea, fever, flatus, headaches, hematochezia, melena, myalgias, nausea, vomiting or weight loss. Nothing aggravates the pain. Relieved by: AZO. The treatment provided mild relief. There is no history of abdominal surgery, colon cancer, Crohn's disease, gallstones, GERD, irritable bowel syndrome, pancreatitis, PUD or ulcerative colitis.   Cystitis   This is a recurrent problem. The current episode started in the past 7 days. The problem occurs " constantly. The problem has been unchanged. Associated symptoms include abdominal pain, a change in bowel habit and urinary symptoms. Pertinent negatives include no anorexia, arthralgias, chest pain, chills, congestion, coughing, diaphoresis, fatigue, fever, headaches, joint swelling, myalgias, nausea, neck pain, numbness, rash, sore throat, swollen glands, vertigo, visual change, vomiting or weakness. Nothing aggravates the symptoms. Treatments tried: AZO. The treatment provided mild relief.     Pt does not want a colonoscopy, and does not want cologard. She wants to check her stool.    The following portions of the patient's history were reviewed and updated as appropriate: allergies, current medications, past family history, past medical history, past social history, past surgical history and problem list.    Past Medical History:   Diagnosis Date   • Atherosclerosis of abdominal aorta    • Atrophic vaginitis    • B12 deficiency    • Rivera's esophagus    • Cardiovascular disease    • Chronic back pain     Chronic back pain with intermittent epidural injections.   • Chronic low back pain    • Coronary artery disease    • Fibromyalgia    • Fibromyalgia    • GERD (gastroesophageal reflux disease)    • H/O mammogram 12/2014   • Hammer toe    • Hiatal hernia    • Hyperlipidemia    • Hypertension    • Lower extremity pain     Lower extremity discomfort: Right ROXANA 1.1, left ROXANA 1.1.   • Normocytic anemia due to blood loss    • Obstructive sleep apnea    • Obstructive sleep apnea treated with continuous positive airway pressure (CPAP)     Obstructive sleep apnea with CPAP compliance.   • Osteoarthritis     severe   • Palpitations     Event monitor, December 2011: Revealed brief episodes of atrial tachycardia.    • Senile osteoporosis        Past Surgical History:   Procedure Laterality Date   • BLADDER REPAIR     • CHOLECYSTECTOMY      Status post cholecystectomy.   • COLONOSCOPY  01/2015   • CORONARY STENT PLACEMENT   05/2015    2 stents 80% RCA 12/2007, [facility] 2/2011. no critical disease   • HYSTERECTOMY      partial   • LUMBAR FUSION  2006    L4-s1   • OTHER SURGICAL HISTORY      [facility]   • VAGINAL REPAIR         Allergies   Allergen Reactions   • Ambien [Zolpidem Tartrate]      amnestic   • Arthrotec [Diclofenac-Misoprostol] GI Intolerance   • Aspirin GI Intolerance   • Bextra [Valdecoxib] GI Intolerance   • Codeine GI Intolerance   • Crestor [Rosuvastatin]      Rxn not known   • Darvon [Propoxyphene] GI Intolerance   • Effient [Prasugrel] Other (See Comments)     Tongue swelling   • Lescol [Fluvastatin]      Rxn not known   • Lexapro [Escitalopram Oxalate] Nausea Only   • Macrobid [Nitrofurantoin] Other (See Comments)     Pain/cramping   • Percocet [Oxycodone-Acetaminophen]      depression   • Pravachol [Pravastatin Sodium]      Rxn not known   • Vioxx [Rofecoxib] GI Intolerance   • Welchol [Colesevelam Hcl]      Joint pain   • Statins    • Ciprofloxacin    • Lyrica [Pregabalin] GI Bleeding and GI Intolerance   • Plavix [Clopidogrel Bisulfate] Swelling     Tongue swelling   • Sulfamethoxazole-Trimethoprim Rash     Past Surgical History:   Procedure Laterality Date   • BLADDER REPAIR     • CHOLECYSTECTOMY      Status post cholecystectomy.   • COLONOSCOPY  01/2015   • CORONARY STENT PLACEMENT  05/2015    2 stents 80% RCA 12/2007, [facility] 2/2011. no critical disease   • HYSTERECTOMY      partial   • LUMBAR FUSION  2006    L4-s1   • OTHER SURGICAL HISTORY      [facility]   • VAGINAL REPAIR         Allergies   Allergen Reactions   • Ambien [Zolpidem Tartrate]      amnestic   • Arthrotec [Diclofenac-Misoprostol] GI Intolerance   • Aspirin GI Intolerance   • Bextra [Valdecoxib] GI Intolerance   • Codeine GI Intolerance   • Crestor [Rosuvastatin]      Rxn not known   • Darvon [Propoxyphene] GI Intolerance   • Effient [Prasugrel] Other (See Comments)     Tongue swelling   • Lescol [Fluvastatin]      Rxn not known   • Lexapro [Escitalopram Oxalate]  Nausea Only   • Macrobid [Nitrofurantoin] Other (See Comments)     Pain/cramping   • Percocet [Oxycodone-Acetaminophen]      depression   • Pravachol [Pravastatin Sodium]      Rxn not known   • Vioxx [Rofecoxib] GI Intolerance   • Welchol [Colesevelam Hcl]      Joint pain   • Statins    • Ciprofloxacin    • Lyrica [Pregabalin] GI Bleeding and GI Intolerance   • Plavix [Clopidogrel Bisulfate] Swelling     Tongue swelling   • Sulfamethoxazole-Trimethoprim Rash       Family History   Problem Relation Age of Onset   • No Known Problems Sister    • Heart disease Sister    • Heart attack Sister 54   • Coronary artery disease Sister    • Hypertension Mother        Social History     Social History   • Marital status:      Spouse name: N/A   • Number of children: N/A   • Years of education: N/A     Occupational History   • Not on file.     Social History Main Topics   • Smoking status: Never Smoker   • Smokeless tobacco: Never Used   • Alcohol use Yes      Comment: rarely micaela   • Drug use: No   • Sexual activity: No     Other Topics Concern   • Not on file     Social History Narrative       Review of Systems   Constitutional: Negative.  Negative for chills, diaphoresis, fatigue, fever and weight loss.   HENT: Positive for rhinorrhea. Negative for congestion, ear pain, nosebleeds, postnasal drip, sinus pressure, sneezing and sore throat.    Eyes: Negative.  Negative for redness and itching.   Respiratory: Negative.  Negative for cough, shortness of breath and wheezing.    Cardiovascular: Negative.  Negative for chest pain and palpitations.   Gastrointestinal: Positive for abdominal pain and change in bowel habit. Negative for anorexia, bowel incontinence, constipation, diarrhea, flatus, hematochezia, melena, nausea and vomiting.   Endocrine: Negative.  Negative for cold intolerance and heat intolerance.   Genitourinary: Positive for bladder incontinence, dysuria, flank pain, frequency, hematuria and urgency.  Negative for pelvic pain.   Musculoskeletal: Positive for back pain. Negative for arthralgias, joint swelling, myalgias and neck pain.   Skin: Negative.  Negative for color change and rash.   Allergic/Immunologic: Positive for environmental allergies.   Neurological: Negative.  Negative for dizziness, vertigo, tingling, syncope, weakness, light-headedness, numbness, headaches and paresthesias.   Hematological: Negative.  Negative for adenopathy. Does not bruise/bleed easily.   Psychiatric/Behavioral: Negative.  Negative for dysphoric mood. The patient is not nervous/anxious.        Objective   Physical Exam   Constitutional: She is oriented to person, place, and time. She appears well-developed.   HENT:   Head: Normocephalic.   Right Ear: External ear normal.   Left Ear: External ear normal.   Nose: Nose normal.   Eyes: Conjunctivae and EOM are normal. Pupils are equal, round, and reactive to light.   Neck: Normal range of motion. Neck supple.   Cardiovascular: Normal rate and regular rhythm.    No murmur heard.  Pulmonary/Chest: Effort normal and breath sounds normal. No respiratory distress. She has no decreased breath sounds. She has no wheezes. She has no rhonchi. She has no rales.   Abdominal: Soft. Bowel sounds are normal. There is tenderness in the suprapubic area. There is CVA tenderness. There is no rebound and no guarding.   Musculoskeletal: Normal range of motion.   Neurological: She is alert and oriented to person, place, and time.   Skin: Skin is warm and dry.   Psychiatric: She has a normal mood and affect. Her behavior is normal.   Nursing note and vitals reviewed.      Assessment/Plan   Gaviota was seen today for recurrent urinary tract infection, flank pain, abdominal pain and urinary urgency.    Diagnoses and all orders for this visit:    Flank pain  -     POCT urinalysis dipstick, automated    Urgency of urination  -     POCT urinalysis dipstick, automated    Urinary tract infection with hematuria,  site unspecified  -     Urine Culture    Screening  -     Cancel: POCT FECAL OCCULT BLOOD BY IMMUNOASSAY  -     POCT FECAL OCCULT BLOOD BY IMMUNOASSAY; Future    Other orders  -     cefuroxime (CEFTIN) 250 MG tablet; Take 1 tablet by mouth 2 (Two) Times a Day.                   Current Outpatient Prescriptions:   •  aspirin 81 MG EC tablet, Take 81 mg by mouth daily., Disp: , Rfl:   •  bisoprolol-hydrochlorothiazide (ZIAC) 5-6.25 MG per tablet, Take 1 tablet by mouth Daily., Disp: 90 tablet, Rfl: 2  •  citalopram (CeleXA) 20 MG tablet, Take 1 tablet by mouth Daily., Disp: 30 tablet, Rfl: 5  •  Cyanocobalamin (NASCOBAL) 500 MCG/0.1ML solution, 1 dose into each nostril 2 (Two) Times a Week., Disp: 8 bottle, Rfl: 3  •  cyanocobalamin 1000 MCG/ML injection, Inject 1 mL into the shoulder, thigh, or buttocks Every 14 (Fourteen) Days., Disp: 6 mL, Rfl: 2  •  diclofenac (VOLTAREN) 75 MG EC tablet, Take 75 mg by mouth. Every 2-3 days prn pain, Disp: , Rfl:   •  isosorbide mononitrate (IMDUR) 30 MG 24 hr tablet, Take 1 tablet by mouth daily., Disp: 90 tablet, Rfl: 1  •  lidocaine (LIDODERM) 5 %, Place 1 patch on the skin Daily. Remove & Discard patch within 12 hours or as directed by MD, Disp: 30 patch, Rfl: 5  •  lidocaine (XYLOCAINE) 2 % jelly, Apply  topically Daily As Needed for mild pain (1-3). (Patient taking differently: Apply  topically As Needed for Mild Pain (1-3).), Disp: 85 g, Rfl: 0  •  Magnesium Chloride powder 15 %, GuaiFENesin powder 10 %, Baclofen powder 5 %, Cyclobenzaprine powder 4 %, Apply small amount to affected area every 2 t0 3 hours as needed until you notice improvement, then apply as needed., Disp: 60 each, Rfl: 11  •  nitroglycerin (NITROSTAT) 0.4 MG SL tablet, Place 1 tablet under the tongue as needed. Prn chest pain, Disp: , Rfl:   •  pantoprazole (PROTONIX) 40 MG EC tablet, Take 1 tablet by mouth Daily., Disp: 90 tablet, Rfl: 1  •  prednisoLONE acetate (PRED FORTE) 1 % ophthalmic suspension,  Administer 1 drop into the left eye 2 (two) times a week., Disp: , Rfl:   •  traMADol (ULTRAM) 50 MG tablet, Take 1 tablet by mouth Every 6 (Six) Hours As Needed for Moderate Pain (4-6)., Disp: 120 tablet, Rfl: 0  •  Zinc 50 MG tablet, Take 1 tablet by mouth As Needed., Disp: , Rfl:   •  cefuroxime (CEFTIN) 250 MG tablet, Take 1 tablet by mouth 2 (Two) Times a Day., Disp: 20 tablet, Rfl: 0    Return in about 3 months (around 10/7/2017), or if symptoms worsen or fail to improve, for Recheck, Next scheduled follow up.    Recent Results (from the past 168 hour(s))   POCT urinalysis dipstick, automated    Collection Time: 07/07/17  1:15 PM   Result Value Ref Range    Color Orange (A) Yellow, Straw, Dark Yellow, Glo    Clarity, UA Clear Clear    Glucose, UA Negative Negative, 1000 mg/dL (3+) mg/dL    Bilirubin Moderate (2+) (A) Negative    Ketones, UA Negative Negative    Specific Gravity  1.010 1.005 - 1.030    Blood, UA Small (A) Negative    pH, Urine 6.0 5.0 - 8.0    Protein, POC Negative Negative mg/dL    Urobilinogen, UA (A) Normal    Leukocytes Moderate (2+) (A) Negative    Nitrite, UA Positive (A) Negative

## 2017-07-09 LAB
BACTERIA UR CULT: NORMAL
BACTERIA UR CULT: NORMAL

## 2017-08-08 ENCOUNTER — TELEPHONE (OUTPATIENT)
Dept: INTERNAL MEDICINE | Facility: CLINIC | Age: 82
End: 2017-08-08

## 2017-08-08 RX ORDER — PANTOPRAZOLE SODIUM 40 MG/1
40 TABLET, DELAYED RELEASE ORAL DAILY
Qty: 90 TABLET | Refills: 1 | Status: SHIPPED | OUTPATIENT
Start: 2017-08-08 | End: 2017-09-29 | Stop reason: SDUPTHER

## 2017-08-17 ENCOUNTER — TELEPHONE (OUTPATIENT)
Dept: INTERNAL MEDICINE | Facility: CLINIC | Age: 82
End: 2017-08-17

## 2017-08-17 DIAGNOSIS — G89.29 CHRONIC MIDLINE LOW BACK PAIN WITH BILATERAL SCIATICA: ICD-10-CM

## 2017-08-17 DIAGNOSIS — M54.42 CHRONIC MIDLINE LOW BACK PAIN WITH BILATERAL SCIATICA: ICD-10-CM

## 2017-08-17 DIAGNOSIS — M54.41 CHRONIC MIDLINE LOW BACK PAIN WITH BILATERAL SCIATICA: ICD-10-CM

## 2017-08-17 RX ORDER — TRAMADOL HYDROCHLORIDE 50 MG/1
50 TABLET ORAL EVERY 6 HOURS PRN
Qty: 120 TABLET | Refills: 0 | Status: SHIPPED | OUTPATIENT
Start: 2017-08-17 | End: 2017-09-29 | Stop reason: SDUPTHER

## 2017-09-29 ENCOUNTER — OFFICE VISIT (OUTPATIENT)
Dept: INTERNAL MEDICINE | Facility: CLINIC | Age: 82
End: 2017-09-29

## 2017-09-29 ENCOUNTER — TELEPHONE (OUTPATIENT)
Dept: INTERNAL MEDICINE | Facility: CLINIC | Age: 82
End: 2017-09-29

## 2017-09-29 VITALS
OXYGEN SATURATION: 96 % | HEART RATE: 65 BPM | SYSTOLIC BLOOD PRESSURE: 116 MMHG | TEMPERATURE: 97.5 F | DIASTOLIC BLOOD PRESSURE: 64 MMHG | BODY MASS INDEX: 27.33 KG/M2 | WEIGHT: 149.4 LBS

## 2017-09-29 DIAGNOSIS — K22.70 BARRETT'S ESOPHAGUS WITHOUT DYSPLASIA: ICD-10-CM

## 2017-09-29 DIAGNOSIS — M54.41 CHRONIC MIDLINE LOW BACK PAIN WITH BILATERAL SCIATICA: Primary | ICD-10-CM

## 2017-09-29 DIAGNOSIS — K21.00 GASTROESOPHAGEAL REFLUX DISEASE WITH ESOPHAGITIS: ICD-10-CM

## 2017-09-29 DIAGNOSIS — E53.8 B12 DEFICIENCY: ICD-10-CM

## 2017-09-29 DIAGNOSIS — M79.2 PERIPHERAL NEUROPATHIC PAIN: ICD-10-CM

## 2017-09-29 DIAGNOSIS — E78.5 HYPERLIPIDEMIA, UNSPECIFIED HYPERLIPIDEMIA TYPE: ICD-10-CM

## 2017-09-29 DIAGNOSIS — M54.42 CHRONIC MIDLINE LOW BACK PAIN WITH BILATERAL SCIATICA: Primary | ICD-10-CM

## 2017-09-29 DIAGNOSIS — R10.13 EPIGASTRIC PAIN: ICD-10-CM

## 2017-09-29 DIAGNOSIS — G89.29 CHRONIC MIDLINE LOW BACK PAIN WITH BILATERAL SCIATICA: Primary | ICD-10-CM

## 2017-09-29 PROCEDURE — 99214 OFFICE O/P EST MOD 30 MIN: CPT | Performed by: FAMILY MEDICINE

## 2017-09-29 RX ORDER — PANTOPRAZOLE SODIUM 40 MG/1
40 TABLET, DELAYED RELEASE ORAL DAILY
Qty: 90 TABLET | Refills: 1 | Status: SHIPPED | OUTPATIENT
Start: 2017-09-29 | End: 2018-02-23 | Stop reason: SDUPTHER

## 2017-09-29 RX ORDER — TRAMADOL HYDROCHLORIDE 50 MG/1
50 TABLET ORAL EVERY 6 HOURS PRN
Qty: 120 TABLET | Refills: 0 | Status: SHIPPED | OUTPATIENT
Start: 2017-09-29 | End: 2017-12-08 | Stop reason: SDUPTHER

## 2017-09-29 RX ORDER — LIDOCAINE 50 MG/G
1 PATCH TOPICAL EVERY 24 HOURS
Qty: 30 PATCH | Refills: 5 | Status: SHIPPED | OUTPATIENT
Start: 2017-09-29 | End: 2017-12-08 | Stop reason: SDUPTHER

## 2017-09-29 RX ORDER — AMOXICILLIN AND CLAVULANATE POTASSIUM 875; 125 MG/1; MG/1
1 TABLET, FILM COATED ORAL 2 TIMES DAILY
COMMUNITY
End: 2017-12-08

## 2017-09-29 RX ORDER — CYANOCOBALAMIN 1000 UG/ML
1000 INJECTION, SOLUTION INTRAMUSCULAR; SUBCUTANEOUS
Qty: 6 ML | Refills: 2 | Status: SHIPPED | OUTPATIENT
Start: 2017-09-29 | End: 2019-02-04 | Stop reason: SDUPTHER

## 2017-09-29 RX ORDER — AMOXICILLIN AND CLAVULANATE POTASSIUM 875; 125 MG/1; MG/1
TABLET, FILM COATED ORAL
COMMUNITY
Start: 2017-09-25 | End: 2017-10-04

## 2017-09-29 NOTE — PROGRESS NOTES
Subjective   Gaviota Evans is a 82 y.o. female.     Chief Complaint   Patient presents with   • Med Management     3 month follow up   • Fibromyalgia     acting up; flare ups with the weather changes       Visit Vitals   • /64   • Pulse 65   • Temp 97.5 °F (36.4 °C)   • Wt 149 lb 6.4 oz (67.8 kg)   • LMP  (LMP Unknown)   • SpO2 96%   • BMI 27.33 kg/m2       Fibromyalgia   This is a chronic problem. The current episode started more than 1 year ago. The problem occurs constantly. The problem has been gradually worsening (worse with weather change). Associated symptoms include abdominal pain, anorexia and myalgias. Pertinent negatives include no arthralgias, change in bowel habit, chest pain, chills, congestion, coughing, diaphoresis, fatigue, fever, headaches, joint swelling, nausea, neck pain, rash, sore throat, swollen glands, urinary symptoms, vertigo, visual change, vomiting or weakness. Exacerbated by: weather changes. She has tried oral narcotics and NSAIDs for the symptoms. The treatment provided moderate relief.   Abdominal Pain   This is a recurrent problem. The current episode started more than 1 month ago. The problem occurs intermittently. The problem has been gradually worsening. The pain is located in the epigastric region. The pain is at a severity of 7/10. The pain is moderate. The quality of the pain is a sensation of fullness. Associated symptoms include anorexia and myalgias. Pertinent negatives include no arthralgias, belching, constipation, diarrhea, dysuria, fever, flatus, frequency, headaches, hematochezia, hematuria, melena, nausea, vomiting or weight loss. Nothing aggravates the pain. The pain is relieved by certain positions. She has tried nothing for the symptoms. The treatment provided no relief.      Pt went to GYN (Dr Erickson) and had UTI 9/25/17 and is on augmentin.  Pt has low back pain and has had surgery on back.   Pt occasionally cannot walk upright.   Pt has numbness and  pain both feet.   Pt is due to see Cardiology next week. Pt is not tolerating any statins    The following portions of the patient's history were reviewed and updated as appropriate: allergies, current medications, past family history, past medical history, past social history, past surgical history and problem list.    Past Medical History:   Diagnosis Date   • Atherosclerosis of abdominal aorta    • Atrophic vaginitis    • B12 deficiency    • Rivera's esophagus    • Cardiovascular disease    • Chronic back pain     Chronic back pain with intermittent epidural injections.   • Chronic low back pain    • Coronary artery disease    • Fibromyalgia    • Fibromyalgia    • GERD (gastroesophageal reflux disease)    • H/O mammogram 12/2014   • Hammer toe    • Hiatal hernia    • Hyperlipidemia    • Hypertension    • Lower extremity pain     Lower extremity discomfort: Right ROXANA 1.1, left ROXANA 1.1.   • Normocytic anemia due to blood loss    • Obstructive sleep apnea    • Obstructive sleep apnea treated with continuous positive airway pressure (CPAP)     Obstructive sleep apnea with CPAP compliance.   • Osteoarthritis     severe   • Palpitations     Event monitor, December 2011: Revealed brief episodes of atrial tachycardia.    • Senile osteoporosis        Past Surgical History:   Procedure Laterality Date   • BLADDER REPAIR     • CHOLECYSTECTOMY      Status post cholecystectomy.   • COLONOSCOPY  01/2015   • CORONARY STENT PLACEMENT  05/2015    2 stents 80% RCA 12/2007, LHC 2/2011. no critical disease   • HYSTERECTOMY      partial   • LUMBAR FUSION  2006    L4-s1   • OTHER SURGICAL HISTORY      [facility]   • VAGINAL REPAIR       Allergies   Allergen Reactions   • Ambien [Zolpidem Tartrate]      amnestic   • Arthrotec [Diclofenac-Misoprostol] GI Intolerance   • Aspirin GI Intolerance   • Bextra [Valdecoxib] GI Intolerance   • Codeine GI Intolerance   • Crestor [Rosuvastatin]      Rxn not known   • Darvon [Propoxyphene] GI Intolerance    • Effient [Prasugrel] Other (See Comments)     Tongue swelling   • Lescol [Fluvastatin]      Rxn not known   • Lexapro [Escitalopram Oxalate] Nausea Only   • Macrobid [Nitrofurantoin] Other (See Comments)     Pain/cramping   • Percocet [Oxycodone-Acetaminophen]      depression   • Pravachol [Pravastatin Sodium]      Rxn not known   • Vioxx [Rofecoxib] GI Intolerance   • Welchol [Colesevelam Hcl]      Joint pain   • Statins    • Ciprofloxacin    • Lyrica [Pregabalin] GI Bleeding and GI Intolerance   • Plavix [Clopidogrel Bisulfate] Swelling     Tongue swelling   • Sulfamethoxazole-Trimethoprim Rash       Family History   Problem Relation Age of Onset   • No Known Problems Sister    • Heart disease Sister    • Heart attack Sister 54   • Coronary artery disease Sister    • Hypertension Mother      Social History     Social History   • Marital status:      Spouse name: N/A   • Number of children: N/A   • Years of education: N/A     Occupational History   • Not on file.     Social History Main Topics   • Smoking status: Never Smoker   • Smokeless tobacco: Never Used   • Alcohol use Yes      Comment: rarely micaela   • Drug use: No   • Sexual activity: No     Other Topics Concern   • Not on file     Social History Narrative       Review of Systems   Constitutional: Negative.  Negative for chills, diaphoresis, fatigue, fever and weight loss.   HENT: Negative.  Negative for congestion, ear pain, nosebleeds, postnasal drip, rhinorrhea, sinus pressure, sneezing and sore throat.    Eyes: Negative.  Negative for redness and itching.   Respiratory: Negative.  Negative for cough, shortness of breath and wheezing.    Cardiovascular: Negative.  Negative for chest pain and palpitations.   Gastrointestinal: Positive for abdominal pain and anorexia. Negative for change in bowel habit, constipation, diarrhea, flatus, hematochezia, melena, nausea and vomiting.   Endocrine: Negative.  Negative for cold intolerance and heat  intolerance.   Genitourinary: Negative.  Negative for dysuria, frequency, hematuria and urgency.   Musculoskeletal: Positive for myalgias. Negative for arthralgias, back pain, joint swelling and neck pain.   Skin: Negative.  Negative for color change and rash.   Allergic/Immunologic: Negative.  Negative for environmental allergies.   Neurological: Negative.  Negative for dizziness, vertigo, syncope, weakness, light-headedness and headaches.   Hematological: Negative.  Negative for adenopathy. Does not bruise/bleed easily.   Psychiatric/Behavioral: Negative.  Negative for dysphoric mood. The patient is not nervous/anxious.        Objective   Physical Exam   Constitutional: She is oriented to person, place, and time. She appears well-developed.   HENT:   Head: Normocephalic.   Right Ear: External ear normal.   Left Ear: External ear normal.   Nose: Nose normal.   Eyes: Conjunctivae and EOM are normal. Pupils are equal, round, and reactive to light.   Neck: Normal range of motion. Neck supple.   Cardiovascular: Normal rate and regular rhythm.    No murmur heard.  Pulmonary/Chest: Effort normal and breath sounds normal.   Abdominal: Soft. Bowel sounds are normal. There is no hepatosplenomegaly. There is tenderness in the right upper quadrant and epigastric area.   Musculoskeletal: Normal range of motion.      During the foot exam she had a monofilament test performed (decreased sensation).    Vascular Status -  Her exam exhibits right foot vasculature normal. Her exam exhibits no right foot edema. Her exam exhibits left foot vasculature normal. Her exam exhibits no left foot edema.   Skin Integrity  -  Her right foot skin is intact.     Gaviota 's left foot skin is intact. .  Neurological: She is alert and oriented to person, place, and time.   Skin: Skin is warm and dry.   Psychiatric: She has a normal mood and affect. Her behavior is normal.   Nursing note and vitals reviewed.      Assessment/Plan   Gaviota was seen today  for med management and fibromyalgia.    Diagnoses and all orders for this visit:    Chronic midline low back pain with bilateral sciatica  -     lidocaine (LIDODERM) 5 %; Place 1 patch on the skin Daily. Remove & Discard patch within 12 hours or as directed by MD for back pain and neuropathy  -     traMADol (ULTRAM) 50 MG tablet; Take 1 tablet by mouth Every 6 (Six) Hours As Needed for Moderate Pain .    Peripheral neuropathic pain  -     lidocaine (LIDODERM) 5 %; Place 1 patch on the skin Daily. Remove & Discard patch within 12 hours or as directed by MD for back pain and neuropathy  -     Folate  -     Vitamin B1, Whole Blood    B12 deficiency  -     cyanocobalamin 1000 MCG/ML injection; Inject 1 mL into the shoulder, thigh, or buttocks Every 14 (Fourteen) Days.  -     Vitamin B12    Rivera's esophagus without dysplasia  -     pantoprazole (PROTONIX) 40 MG EC tablet; Take 1 tablet by mouth Daily.    Gastroesophageal reflux disease with esophagitis  -     pantoprazole (PROTONIX) 40 MG EC tablet; Take 1 tablet by mouth Daily.    Epigastric pain  -     US abdomen limited; Future  -     Comprehensive Metabolic Panel  -     CBC & Differential  -     Amylase  -     Lipase    Hyperlipidemia, unspecified hyperlipidemia type  -     Lipid Panel             pt will get flu shot after getting off of antibiotics      Current Outpatient Prescriptions:   •  amoxicillin-clavulanate (AUGMENTIN) 875-125 MG per tablet, , Disp: , Rfl:   •  amoxicillin-clavulanate (AUGMENTIN) 875-125 MG per tablet, Take 1 tablet by mouth 2 (Two) Times a Day., Disp: , Rfl:   •  aspirin 81 MG EC tablet, Take 81 mg by mouth daily., Disp: , Rfl:   •  bisoprolol-hydrochlorothiazide (ZIAC) 5-6.25 MG per tablet, Take 1 tablet by mouth Daily., Disp: 90 tablet, Rfl: 2  •  citalopram (CeleXA) 20 MG tablet, Take 1 tablet by mouth Daily., Disp: 30 tablet, Rfl: 5  •  Cyanocobalamin (NASCOBAL) 500 MCG/0.1ML solution, 1 dose into each nostril 2 (Two) Times a Week.,  Disp: 8 bottle, Rfl: 3  •  cyanocobalamin 1000 MCG/ML injection, Inject 1 mL into the shoulder, thigh, or buttocks Every 14 (Fourteen) Days., Disp: 6 mL, Rfl: 2  •  diclofenac (VOLTAREN) 75 MG EC tablet, Take 75 mg by mouth. Every 2-3 days prn pain, Disp: , Rfl:   •  isosorbide mononitrate (IMDUR) 30 MG 24 hr tablet, Take 1 tablet by mouth daily., Disp: 90 tablet, Rfl: 1  •  lidocaine (LIDODERM) 5 %, Place 1 patch on the skin Daily. Remove & Discard patch within 12 hours or as directed by MD for back pain and neuropathy, Disp: 30 patch, Rfl: 5  •  lidocaine (XYLOCAINE) 2 % jelly, Apply  topically Daily As Needed for mild pain (1-3). (Patient taking differently: Apply  topically As Needed for Mild Pain (1-3).), Disp: 85 g, Rfl: 0  •  Magnesium Chloride powder 15 %, GuaiFENesin powder 10 %, Baclofen powder 5 %, Cyclobenzaprine powder 4 %, Apply small amount to affected area every 2 t0 3 hours as needed until you notice improvement, then apply as needed., Disp: 60 each, Rfl: 11  •  nitroglycerin (NITROSTAT) 0.4 MG SL tablet, Place 1 tablet under the tongue as needed. Prn chest pain, Disp: , Rfl:   •  pantoprazole (PROTONIX) 40 MG EC tablet, Take 1 tablet by mouth Daily., Disp: 90 tablet, Rfl: 1  •  prednisoLONE acetate (PRED FORTE) 1 % ophthalmic suspension, Administer 1 drop into the left eye 2 (two) times a week., Disp: , Rfl:   •  traMADol (ULTRAM) 50 MG tablet, Take 1 tablet by mouth Every 6 (Six) Hours As Needed for Moderate Pain ., Disp: 120 tablet, Rfl: 0  •  Zinc 50 MG tablet, Take 1 tablet by mouth As Needed., Disp: , Rfl:     Return in about 4 weeks (around 10/27/2017), or if symptoms worsen or fail to improve, for Recheck.

## 2017-10-03 LAB
ALBUMIN SERPL-MCNC: 4.1 G/DL (ref 3.2–4.8)
ALBUMIN/GLOB SERPL: 1.2 G/DL (ref 1.5–2.5)
ALP SERPL-CCNC: 107 U/L (ref 25–100)
ALT SERPL-CCNC: 17 U/L (ref 7–40)
AMYLASE SERPL-CCNC: 49 U/L (ref 30–118)
AST SERPL-CCNC: 19 U/L (ref 0–33)
BASOPHILS # BLD AUTO: 0.02 10*3/MM3 (ref 0–0.2)
BASOPHILS NFR BLD AUTO: 0.3 % (ref 0–1)
BILIRUB SERPL-MCNC: 0.3 MG/DL (ref 0.3–1.2)
BUN SERPL-MCNC: 19 MG/DL (ref 9–23)
BUN/CREAT SERPL: 19 (ref 7–25)
CALCIUM SERPL-MCNC: 9.5 MG/DL (ref 8.7–10.4)
CHLORIDE SERPL-SCNC: 99 MMOL/L (ref 99–109)
CHOLEST SERPL-MCNC: 232 MG/DL (ref 0–200)
CO2 SERPL-SCNC: 29 MMOL/L (ref 20–31)
CREAT SERPL-MCNC: 1 MG/DL (ref 0.6–1.3)
EOSINOPHIL # BLD AUTO: 0.29 10*3/MM3 (ref 0–0.3)
EOSINOPHIL NFR BLD AUTO: 4 % (ref 0–3)
ERYTHROCYTE [DISTWIDTH] IN BLOOD BY AUTOMATED COUNT: 15.4 % (ref 11.3–14.5)
FOLATE SERPL-MCNC: 7.08 NG/ML (ref 3.2–20)
GLOBULIN SER CALC-MCNC: 3.5 GM/DL
GLUCOSE SERPL-MCNC: 96 MG/DL (ref 70–100)
HCT VFR BLD AUTO: 41.8 % (ref 34.5–44)
HDLC SERPL-MCNC: 58 MG/DL (ref 40–60)
HGB BLD-MCNC: 13.4 G/DL (ref 11.5–15.5)
IMM GRANULOCYTES # BLD: 0.01 10*3/MM3 (ref 0–0.03)
IMM GRANULOCYTES NFR BLD: 0.1 % (ref 0–0.6)
LDLC SERPL CALC-MCNC: 156 MG/DL (ref 0–100)
LIPASE SERPL-CCNC: 28 U/L (ref 6–51)
LYMPHOCYTES # BLD AUTO: 2.44 10*3/MM3 (ref 0.6–4.8)
LYMPHOCYTES NFR BLD AUTO: 33.3 % (ref 24–44)
MCH RBC QN AUTO: 28 PG (ref 27–31)
MCHC RBC AUTO-ENTMCNC: 32.1 G/DL (ref 32–36)
MCV RBC AUTO: 87.4 FL (ref 80–99)
MONOCYTES # BLD AUTO: 1.1 10*3/MM3 (ref 0–1)
MONOCYTES NFR BLD AUTO: 15 % (ref 0–12)
NEUTROPHILS # BLD AUTO: 3.46 10*3/MM3 (ref 1.5–8.3)
NEUTROPHILS NFR BLD AUTO: 47.3 % (ref 41–71)
PLATELET # BLD AUTO: 282 10*3/MM3 (ref 150–450)
POTASSIUM SERPL-SCNC: 4 MMOL/L (ref 3.5–5.5)
PROT SERPL-MCNC: 7.6 G/DL (ref 5.7–8.2)
RBC # BLD AUTO: 4.78 10*6/MM3 (ref 3.89–5.14)
SODIUM SERPL-SCNC: 138 MMOL/L (ref 132–146)
TRIGL SERPL-MCNC: 88 MG/DL (ref 0–150)
VIT B1 BLD-SCNC: 144.6 NMOL/L (ref 66.5–200)
VIT B12 SERPL-MCNC: 1888 PG/ML (ref 211–911)
VLDLC SERPL CALC-MCNC: 17.6 MG/DL
WBC # BLD AUTO: 7.32 10*3/MM3 (ref 3.5–10.8)

## 2017-10-04 ENCOUNTER — TELEPHONE (OUTPATIENT)
Dept: INTERNAL MEDICINE | Facility: CLINIC | Age: 82
End: 2017-10-04

## 2017-10-04 ENCOUNTER — OFFICE VISIT (OUTPATIENT)
Dept: CARDIOLOGY | Facility: CLINIC | Age: 82
End: 2017-10-04

## 2017-10-04 ENCOUNTER — HOSPITAL ENCOUNTER (OUTPATIENT)
Dept: ULTRASOUND IMAGING | Facility: HOSPITAL | Age: 82
Discharge: HOME | End: 2017-10-04
Admitting: FAMILY MEDICINE

## 2017-10-04 VITALS
DIASTOLIC BLOOD PRESSURE: 68 MMHG | OXYGEN SATURATION: 96 % | HEART RATE: 77 BPM | SYSTOLIC BLOOD PRESSURE: 150 MMHG | WEIGHT: 153.2 LBS | HEIGHT: 62 IN | BODY MASS INDEX: 28.19 KG/M2

## 2017-10-04 DIAGNOSIS — R10.13 EPIGASTRIC PAIN: ICD-10-CM

## 2017-10-04 DIAGNOSIS — E78.5 DYSLIPIDEMIA: ICD-10-CM

## 2017-10-04 DIAGNOSIS — I10 ESSENTIAL HYPERTENSION: ICD-10-CM

## 2017-10-04 DIAGNOSIS — R00.2 PALPITATIONS: ICD-10-CM

## 2017-10-04 DIAGNOSIS — I25.10 CORONARY ARTERY DISEASE INVOLVING NATIVE CORONARY ARTERY OF NATIVE HEART WITHOUT ANGINA PECTORIS: Primary | ICD-10-CM

## 2017-10-04 PROCEDURE — 99214 OFFICE O/P EST MOD 30 MIN: CPT | Performed by: INTERNAL MEDICINE

## 2017-10-04 PROCEDURE — 76705 ECHO EXAM OF ABDOMEN: CPT

## 2017-10-04 RX ORDER — VITAMIN E 268 MG
400 CAPSULE ORAL DAILY
COMMUNITY
End: 2020-04-14

## 2017-10-04 RX ORDER — NITROGLYCERIN 0.4 MG/1
0.4 TABLET SUBLINGUAL AS NEEDED
Qty: 25 TABLET | Refills: 11 | Status: SHIPPED | OUTPATIENT
Start: 2017-10-04 | End: 2018-11-26 | Stop reason: SDUPTHER

## 2017-10-04 RX ORDER — EZETIMIBE 10 MG/1
10 TABLET ORAL DAILY
Qty: 90 TABLET | Refills: 3 | Status: SHIPPED | OUTPATIENT
Start: 2017-10-04 | End: 2017-12-08 | Stop reason: SINTOL

## 2017-10-04 RX ORDER — ISOSORBIDE MONONITRATE 30 MG/1
30 TABLET, EXTENDED RELEASE ORAL DAILY
Qty: 90 TABLET | Refills: 3 | Status: SHIPPED | OUTPATIENT
Start: 2017-10-04 | End: 2018-10-24 | Stop reason: SDUPTHER

## 2017-10-04 RX ORDER — BISOPROLOL FUMARATE AND HYDROCHLOROTHIAZIDE 5; 6.25 MG/1; MG/1
1 TABLET ORAL DAILY
Qty: 90 TABLET | Refills: 3 | Status: SHIPPED | OUTPATIENT
Start: 2017-10-04 | End: 2018-10-24 | Stop reason: SDUPTHER

## 2017-10-04 NOTE — PROGRESS NOTES
Gaviota Evans  1935  [phone]      10/04/2017    Angela Sorto MD    Chief Complaint: Coronary Artery Disease; Palpitations; and Hypertension    PROBLEM LIST:  1. Coronary artery disease:  a. Left heart catheterization 12/10/2007, Dr. Grewal:  Status post 2.5 x 8 mm Micro  stent, 2.5 x 14 mm Micro  stent, and 2.5 x 15 mm Micro   stent placement to the LAD and OM1.    b. Repeat left heart catheterization 01/04/2008, Dr. Manley:  Status post 3.5 x 16 mm Liberté bare metal stent to RCA.  c. GXT Cardiolite 06/2010 Dr. Harris:  Patient did not reach target heart rate secondary  to not holding beta blocker, negative for ischemia.  d. Cardiac catheterization 03/01/2011, Dr. Goodwin:  LVEF of 55% to 60%, all 3 stents were found to be patent.  No territories appear to be a cause of ischemia.  Zebeta changed to Diltiazem 120  mg per day.  e. Echocardiogram, 03/01/2011:  Mild AI, trace MR, mild TR, LVEF of 55% to 60%.  f. Cardiac catheterization, 05/12/2015, secondary to recurrent angina; 90% and 60% sequential stenosis in the obtuse marginal branch reduced to 0 with a 2.5  x 23 mm and a 2.5 x 8 mm Xience Alpine drug-eluting stent by Dr. Cooney.  Non-flow-limiting disease of the LAD and diagonal branch (40%), 50% RCA, normal LVEF.  2. Palpitations:  a.  Event monitor, December 2011: Revealed brief episodes of atrial tachycardia.   3.  Hypertension.  4. Hyperlipidemia.  5. Obstructive sleep apnea with CPAP compliance.  6. Fibromyalgia.  7. Chronic back pain with intermittent epidural injections.  8. Lower extremity edema/discomfort:  a. Remote ROXANA:  right ROXANA 1.1, left ROXANA 1.1.  b. Bilateral lower extremity duplex, 3/27/2017: No significant right lower extremity atherosclerotic arterial disease. Mild left lower extremity atherosclerotic arterial disease.   9.  Status post cholecystectomy.    Allergies   Allergen Reactions   • Ambien [Zolpidem Tartrate]      amnestic   • Arthrotec  [Diclofenac-Misoprostol] GI Intolerance   • Aspirin GI Intolerance   • Bextra [Valdecoxib] GI Intolerance   • Codeine GI Intolerance   • Crestor [Rosuvastatin]      Rxn not known   • Darvon [Propoxyphene] GI Intolerance   • Effient [Prasugrel] Other (See Comments)     Tongue swelling   • Lescol [Fluvastatin]      Rxn not known   • Lexapro [Escitalopram Oxalate] Nausea Only   • Macrobid [Nitrofurantoin] Other (See Comments)     Pain/cramping   • Percocet [Oxycodone-Acetaminophen]      depression   • Pravachol [Pravastatin Sodium]      Rxn not known   • Vioxx [Rofecoxib] GI Intolerance   • Welchol [Colesevelam Hcl]      Joint pain   • Statins    • Ciprofloxacin    • Lyrica [Pregabalin] GI Bleeding and GI Intolerance   • Plavix [Clopidogrel Bisulfate] Swelling     Tongue swelling   • Sulfamethoxazole-Trimethoprim Rash       Current Medications:    Current Outpatient Prescriptions:   •  amoxicillin-clavulanate (AUGMENTIN) 875-125 MG per tablet, Take 1 tablet by mouth 2 (Two) Times a Day., Disp: , Rfl:   •  aspirin 81 MG EC tablet, Take 81 mg by mouth daily., Disp: , Rfl:   •  bisoprolol-hydrochlorothiazide (ZIAC) 5-6.25 MG per tablet, Take 1 tablet by mouth Daily., Disp: 90 tablet, Rfl: 2  •  citalopram (CeleXA) 20 MG tablet, Take 1 tablet by mouth Daily., Disp: 30 tablet, Rfl: 5  •  Coenzyme Q10 (CO Q 10 PO), Take  by mouth Daily., Disp: , Rfl:   •  cyanocobalamin 1000 MCG/ML injection, Inject 1 mL into the shoulder, thigh, or buttocks Every 14 (Fourteen) Days., Disp: 6 mL, Rfl: 2  •  diclofenac (VOLTAREN) 75 MG EC tablet, Take 75 mg by mouth. Every 2-3 days prn pain, Disp: , Rfl:   •  isosorbide mononitrate (IMDUR) 30 MG 24 hr tablet, Take 1 tablet by mouth daily., Disp: 90 tablet, Rfl: 1  •  lidocaine (LIDODERM) 5 %, Place 1 patch on the skin Daily. Remove & Discard patch within 12 hours or as directed by MD for back pain and neuropathy, Disp: 30 patch, Rfl: 5  •  nitroglycerin (NITROSTAT) 0.4 MG SL tablet, Place 1  "tablet under the tongue as needed. Prn chest pain, Disp: , Rfl:   •  pantoprazole (PROTONIX) 40 MG EC tablet, Take 1 tablet by mouth Daily., Disp: 90 tablet, Rfl: 1  •  prednisoLONE acetate (PRED FORTE) 1 % ophthalmic suspension, Administer 1 drop into the left eye 2 (two) times a week., Disp: , Rfl:   •  traMADol (ULTRAM) 50 MG tablet, Take 1 tablet by mouth Every 6 (Six) Hours As Needed for Moderate Pain ., Disp: 120 tablet, Rfl: 0  •  vitamin E 400 UNIT capsule, Take 400 Units by mouth Daily. 3 tablets daily, Disp: , Rfl:     HPI   Patient returns today for follow up. Since last visit notes that while she is not having chest pain she is persistently tired. She was recently prescribed \"pain patches\" for relief from fibromyalgia but her insurance would not pay for them.  Also reports leg swelling despite wearing support stockings. Otherwise, she is doing well from a cardiac standpoint. Denies and complaints of chest pain, pressure, tightness, or unusual dyspnea.  Unable to tolerate statins but has never tried Zetia.    The following portions of the patient's history were reviewed and updated as appropriate: allergies, current medications and problem list.    Pertinent positives as listed in the HPI.  All other systems reviewed are negative.    Vitals:    10/04/17 1301   BP: 150/68   BP Location: Right arm   Patient Position: Sitting   Pulse: 77   SpO2: 96%   Weight: 153 lb 3.2 oz (69.5 kg)   Height: 62\" (157.5 cm)       General: Alert, awake, and oriented  Neck: Jugular venous pressure is within normal limits. Carotids have normal upstrokes without bruits.   Cardiovascular: Heart has a nondisplaced focal PMI. Regular rate and rhythm without murmur, gallop or rub.  Lungs: Clear without rales or wheezes. Equal expansion is noted.   Extremities: Shows trace edema.  Skin: Warm and dry.  Neurologic: nonfocal    Diagnostic Data 9/29/2017:  Lipid panel    TG 88  HDL 58      Lab Results   Component Value Date "    GLUCOSE 77 08/22/2016    BUN 19 09/29/2017    CREATININE 1.00 09/29/2017    EGFRIFNONA 53 (L) 09/29/2017    EGFRIFAFRI 64 09/29/2017    BCR 19.0 09/29/2017    K 4.0 09/29/2017    CO2 29.0 09/29/2017    CALCIUM 9.5 09/29/2017    PROTENTOTREF 7.6 09/29/2017    ALBUMIN 4.10 09/29/2017    LABIL2 1.2 (L) 09/29/2017    AST 19 09/29/2017    ALT 17 09/29/2017     Lab Results   Component Value Date    WBC 7.32 09/29/2017    HGB 13.4 09/29/2017    HCT 41.8 09/29/2017    MCV 87.4 09/29/2017     09/29/2017       Procedures    Assessment:    ICD-10-CM ICD-9-CM   1. Coronary artery disease involving native coronary artery of native heart without angina pectoris, stable aside from fatigue I25.10 414.01   2. Palpitations, no recurrences R00.2 785.1   3. Essential hypertension, Controlled I10 401.9   4. Dyslipidemia, Uncontrolled, Statin intolerance noted. E78.5 272.4       Plan:    1. Recommend starting a trial of Zetia 10 mg once daily, and monitoring her tolerance.  2. Check labs in 3 months.  3. Continue current medications.  4. F/up in 12 months or sooner if needed.      Scribed for Susan Goodwin MD by Augustin Figueroa. 10/4/2017  1:18 PM    I Susan Goodwin MD personally performed the services described in this documentation as scribed by the above individual in my presence, and it is both accurate and complete.    Susan Goodwin MD, FACC

## 2017-10-10 ENCOUNTER — TRANSCRIBE ORDERS (OUTPATIENT)
Dept: ADMINISTRATIVE | Facility: HOSPITAL | Age: 82
End: 2017-10-10

## 2017-10-10 DIAGNOSIS — Z12.31 VISIT FOR SCREENING MAMMOGRAM: Primary | ICD-10-CM

## 2017-11-02 ENCOUNTER — APPOINTMENT (OUTPATIENT)
Dept: MAMMOGRAPHY | Facility: HOSPITAL | Age: 82
End: 2017-11-02

## 2017-12-08 ENCOUNTER — OFFICE VISIT (OUTPATIENT)
Dept: INTERNAL MEDICINE | Facility: CLINIC | Age: 82
End: 2017-12-08

## 2017-12-08 VITALS
BODY MASS INDEX: 27.9 KG/M2 | HEART RATE: 72 BPM | TEMPERATURE: 97.3 F | OXYGEN SATURATION: 93 % | SYSTOLIC BLOOD PRESSURE: 114 MMHG | DIASTOLIC BLOOD PRESSURE: 70 MMHG | WEIGHT: 152.56 LBS

## 2017-12-08 DIAGNOSIS — M79.7 FIBROMYALGIA: Primary | ICD-10-CM

## 2017-12-08 DIAGNOSIS — M79.2 PERIPHERAL NEUROPATHIC PAIN: ICD-10-CM

## 2017-12-08 DIAGNOSIS — E78.5 HYPERLIPIDEMIA, UNSPECIFIED HYPERLIPIDEMIA TYPE: ICD-10-CM

## 2017-12-08 DIAGNOSIS — I25.10 ASCVD (ARTERIOSCLEROTIC CARDIOVASCULAR DISEASE): ICD-10-CM

## 2017-12-08 DIAGNOSIS — M54.41 CHRONIC MIDLINE LOW BACK PAIN WITH BILATERAL SCIATICA: ICD-10-CM

## 2017-12-08 DIAGNOSIS — G89.29 CHRONIC MIDLINE LOW BACK PAIN WITH BILATERAL SCIATICA: ICD-10-CM

## 2017-12-08 DIAGNOSIS — R30.0 DYSURIA: ICD-10-CM

## 2017-12-08 DIAGNOSIS — M54.42 CHRONIC MIDLINE LOW BACK PAIN WITH BILATERAL SCIATICA: ICD-10-CM

## 2017-12-08 LAB
BILIRUB BLD-MCNC: NEGATIVE MG/DL
CLARITY, POC: CLEAR
COLOR UR: YELLOW
GLUCOSE UR STRIP-MCNC: NEGATIVE MG/DL
KETONES UR QL: NEGATIVE
LEUKOCYTE EST, POC: ABNORMAL
NITRITE UR-MCNC: POSITIVE MG/ML
PH UR: 6 [PH] (ref 5–8)
PROT UR STRIP-MCNC: NEGATIVE MG/DL
RBC # UR STRIP: NEGATIVE /UL
SP GR UR: 1.01 (ref 1–1.03)
UROBILINOGEN UR QL: NORMAL

## 2017-12-08 PROCEDURE — 81003 URINALYSIS AUTO W/O SCOPE: CPT | Performed by: FAMILY MEDICINE

## 2017-12-08 PROCEDURE — 99214 OFFICE O/P EST MOD 30 MIN: CPT | Performed by: FAMILY MEDICINE

## 2017-12-08 RX ORDER — AMOXICILLIN AND CLAVULANATE POTASSIUM 875; 125 MG/1; MG/1
1 TABLET, FILM COATED ORAL 2 TIMES DAILY
Qty: 20 TABLET | Refills: 0 | Status: SHIPPED | OUTPATIENT
Start: 2017-12-08 | End: 2018-02-23

## 2017-12-08 RX ORDER — DICLOFENAC SODIUM 75 MG/1
75 TABLET, DELAYED RELEASE ORAL 2 TIMES DAILY
Qty: 60 TABLET | Refills: 2 | Status: SHIPPED | OUTPATIENT
Start: 2017-12-08 | End: 2018-02-12 | Stop reason: SDUPTHER

## 2017-12-08 RX ORDER — TRAMADOL HYDROCHLORIDE 50 MG/1
50 TABLET ORAL EVERY 6 HOURS PRN
Qty: 120 TABLET | Refills: 0 | Status: SHIPPED | OUTPATIENT
Start: 2017-12-08 | End: 2018-02-12 | Stop reason: SDUPTHER

## 2017-12-08 RX ORDER — LIDOCAINE 50 MG/G
1 PATCH TOPICAL EVERY 24 HOURS
Qty: 30 PATCH | Refills: 5 | Status: SHIPPED | OUTPATIENT
Start: 2017-12-08 | End: 2018-04-04

## 2017-12-12 ENCOUNTER — APPOINTMENT (OUTPATIENT)
Dept: MAMMOGRAPHY | Facility: HOSPITAL | Age: 82
End: 2017-12-12

## 2017-12-12 ENCOUNTER — TELEPHONE (OUTPATIENT)
Dept: INTERNAL MEDICINE | Facility: CLINIC | Age: 82
End: 2017-12-12

## 2017-12-12 LAB
ALBUMIN SERPL-MCNC: 4.1 G/DL (ref 3.2–4.8)
ALBUMIN/GLOB SERPL: 1.2 G/DL (ref 1.5–2.5)
ALP SERPL-CCNC: 92 U/L (ref 25–100)
ALT SERPL-CCNC: 17 U/L (ref 7–40)
AST SERPL-CCNC: 22 U/L (ref 0–33)
BACTERIA UR CULT: ABNORMAL
BACTERIA UR CULT: ABNORMAL
BILIRUB SERPL-MCNC: 0.4 MG/DL (ref 0.3–1.2)
BUN SERPL-MCNC: 15 MG/DL (ref 9–23)
BUN/CREAT SERPL: 16.7 (ref 7–25)
CALCIUM SERPL-MCNC: 9.2 MG/DL (ref 8.7–10.4)
CHLORIDE SERPL-SCNC: 98 MMOL/L (ref 99–109)
CHOLEST SERPL-MCNC: 213 MG/DL (ref 0–200)
CO2 SERPL-SCNC: 31 MMOL/L (ref 20–31)
CREAT SERPL-MCNC: 0.9 MG/DL (ref 0.6–1.3)
GFR SERPLBLD CREATININE-BSD FMLA CKD-EPI: 60 ML/MIN/1.73
GFR SERPLBLD CREATININE-BSD FMLA CKD-EPI: 73 ML/MIN/1.73
GLOBULIN SER CALC-MCNC: 3.3 GM/DL
GLUCOSE SERPL-MCNC: 93 MG/DL (ref 70–100)
HDLC SERPL-MCNC: 56 MG/DL (ref 40–60)
LDLC SERPL CALC-MCNC: 133 MG/DL (ref 0–100)
OTHER ANTIBIOTIC SUSC ISLT: ABNORMAL
POTASSIUM SERPL-SCNC: 3.8 MMOL/L (ref 3.5–5.5)
PROT SERPL-MCNC: 7.4 G/DL (ref 5.7–8.2)
SODIUM SERPL-SCNC: 135 MMOL/L (ref 132–146)
TRIGL SERPL-MCNC: 121 MG/DL (ref 0–150)
VLDLC SERPL CALC-MCNC: 24.2 MG/DL

## 2017-12-12 NOTE — TELEPHONE ENCOUNTER
Tried to fax to # given twice.  There is no answer.  Called and spoke to Rosenda, she gave another fax # of [phone].  Fax was successful

## 2017-12-12 NOTE — TELEPHONE ENCOUNTER
Tanya with [facility] called needing 3 mammo orders to be faxed to Tanya before 12/ 14/2017@ (f) [phone], (p) [phone]      1) US bilateral  2)Mammo Dx  3)mammo Screening

## 2017-12-13 ENCOUNTER — TELEPHONE (OUTPATIENT)
Dept: INTERNAL MEDICINE | Facility: CLINIC | Age: 82
End: 2017-12-13

## 2017-12-13 NOTE — TELEPHONE ENCOUNTER
----- Message from Angela VU MD sent at 12/12/2017  2:23 PM EST -----  Please call the patient regarding her abnormal result.    Pt has pseudomonas in urine, what is allergy type to cipro/levaquin- as the rest of meds that she can use are injectables.  LDL elevated ,low animal fat diet

## 2017-12-15 RX ORDER — LEVOFLOXACIN 500 MG/1
500 TABLET, FILM COATED ORAL DAILY
Qty: 7 TABLET | Refills: 0 | Status: SHIPPED | OUTPATIENT
Start: 2017-12-15 | End: 2018-01-09

## 2017-12-15 NOTE — TELEPHONE ENCOUNTER
She gets a sick feeling in her stomach with Cipro. The Levoflaxin is pended in the chart. She asked we send something in to the [facility] [address]

## 2018-01-05 ENCOUNTER — TELEPHONE (OUTPATIENT)
Dept: INTERNAL MEDICINE | Facility: CLINIC | Age: 83
End: 2018-01-05

## 2018-01-05 DIAGNOSIS — G89.29 CHRONIC PAIN OF LEFT KNEE: Primary | ICD-10-CM

## 2018-01-05 DIAGNOSIS — M25.562 CHRONIC PAIN OF LEFT KNEE: Primary | ICD-10-CM

## 2018-01-05 NOTE — TELEPHONE ENCOUNTER
Left knee - 10+. Bowie night she went to ER for the knee pain. Scan in computer. She states it is very swollen still. Hydrocodone 7.5mg-325mg from her dental procedure. She's been taking a few of those at night from old rx. It helps her sleep some, but still not great. I did tell her that she will probably need to be seen first. She stated she would not want to spend another bad weekend like she did last weekend.

## 2018-01-08 ENCOUNTER — TELEPHONE (OUTPATIENT)
Dept: INTERNAL MEDICINE | Facility: CLINIC | Age: 83
End: 2018-01-08

## 2018-01-08 NOTE — TELEPHONE ENCOUNTER
Doppler scan negative for blood clot.    Ortho does not make house calls, but we can put in referral. Has she seen Ortho in past?

## 2018-01-09 ENCOUNTER — TELEPHONE (OUTPATIENT)
Dept: INTERNAL MEDICINE | Facility: CLINIC | Age: 83
End: 2018-01-09

## 2018-01-09 DIAGNOSIS — N39.0 RECURRENT UTI: Primary | ICD-10-CM

## 2018-01-09 RX ORDER — LEVOFLOXACIN 500 MG/1
500 TABLET, FILM COATED ORAL DAILY
Qty: 10 TABLET | Refills: 0 | Status: SHIPPED | OUTPATIENT
Start: 2018-01-09 | End: 2018-02-23

## 2018-01-09 NOTE — TELEPHONE ENCOUNTER
Called mrs Evans. She would like a referral. She would prefer a referral to someone in [place] or [place] so it is closer to her location. She doesn't want to drive far.

## 2018-01-09 NOTE — TELEPHONE ENCOUNTER
LM there was only one med sent to the pharmacy and that was levaquin.  The other medication she is already on, generic celexa.

## 2018-01-09 NOTE — TELEPHONE ENCOUNTER
UTI has returned and patient is unable to control her urine. She is having incontinence this time as well as lower abdominal pain. She had finished out the antibiotics from a few weeks ago.

## 2018-02-12 ENCOUNTER — TELEPHONE (OUTPATIENT)
Dept: INTERNAL MEDICINE | Facility: CLINIC | Age: 83
End: 2018-02-12

## 2018-02-12 DIAGNOSIS — M54.42 CHRONIC MIDLINE LOW BACK PAIN WITH BILATERAL SCIATICA: ICD-10-CM

## 2018-02-12 DIAGNOSIS — M54.41 CHRONIC MIDLINE LOW BACK PAIN WITH BILATERAL SCIATICA: ICD-10-CM

## 2018-02-12 DIAGNOSIS — G89.29 CHRONIC MIDLINE LOW BACK PAIN WITH BILATERAL SCIATICA: ICD-10-CM

## 2018-02-12 RX ORDER — DICLOFENAC SODIUM 75 MG/1
75 TABLET, DELAYED RELEASE ORAL 2 TIMES DAILY PRN
Qty: 60 TABLET | Refills: 0 | Status: SHIPPED | OUTPATIENT
Start: 2018-02-12 | End: 2018-09-14 | Stop reason: SDUPTHER

## 2018-02-12 RX ORDER — TRAMADOL HYDROCHLORIDE 50 MG/1
50 TABLET ORAL EVERY 6 HOURS PRN
Qty: 120 TABLET | Refills: 0 | Status: SHIPPED | OUTPATIENT
Start: 2018-02-12 | End: 2018-04-04 | Stop reason: SDUPTHER

## 2018-02-12 NOTE — TELEPHONE ENCOUNTER
PT CALLED HAS APPT IN [place] SHE IS OUT OF PAIN MED KNEE IS HURTING CAN DR ENG WRITE A RX TILL HER APPT IN [place]

## 2018-02-23 ENCOUNTER — OFFICE VISIT (OUTPATIENT)
Dept: INTERNAL MEDICINE | Facility: CLINIC | Age: 83
End: 2018-02-23

## 2018-02-23 ENCOUNTER — TELEPHONE (OUTPATIENT)
Dept: INTERNAL MEDICINE | Facility: CLINIC | Age: 83
End: 2018-02-23

## 2018-02-23 VITALS
TEMPERATURE: 97.6 F | OXYGEN SATURATION: 96 % | HEART RATE: 77 BPM | RESPIRATION RATE: 16 BRPM | DIASTOLIC BLOOD PRESSURE: 58 MMHG | SYSTOLIC BLOOD PRESSURE: 120 MMHG | HEIGHT: 62 IN | BODY MASS INDEX: 28.41 KG/M2 | WEIGHT: 154.4 LBS

## 2018-02-23 DIAGNOSIS — K22.70 BARRETT'S ESOPHAGUS WITHOUT DYSPLASIA: ICD-10-CM

## 2018-02-23 DIAGNOSIS — K21.00 GASTROESOPHAGEAL REFLUX DISEASE WITH ESOPHAGITIS: ICD-10-CM

## 2018-02-23 DIAGNOSIS — M25.562 CHRONIC PAIN OF LEFT KNEE: Primary | ICD-10-CM

## 2018-02-23 DIAGNOSIS — G89.29 CHRONIC PAIN OF LEFT KNEE: Primary | ICD-10-CM

## 2018-02-23 DIAGNOSIS — M25.462 KNEE EFFUSION, LEFT: ICD-10-CM

## 2018-02-23 PROCEDURE — 99214 OFFICE O/P EST MOD 30 MIN: CPT | Performed by: NURSE PRACTITIONER

## 2018-02-23 RX ORDER — CITALOPRAM 20 MG/1
20 TABLET ORAL DAILY
Qty: 30 TABLET | Refills: 0 | Status: SHIPPED | OUTPATIENT
Start: 2018-02-23 | End: 2018-04-04 | Stop reason: SDUPTHER

## 2018-02-23 RX ORDER — PANTOPRAZOLE SODIUM 40 MG/1
40 TABLET, DELAYED RELEASE ORAL DAILY
Qty: 90 TABLET | Refills: 0 | Status: SHIPPED | OUTPATIENT
Start: 2018-02-23 | End: 2018-10-24 | Stop reason: SDUPTHER

## 2018-02-23 NOTE — TELEPHONE ENCOUNTER
Mrs Evans left a note during her visit with the APRN asking for refills of Citalopram and Pantoprazole.

## 2018-02-23 NOTE — PROGRESS NOTES
Subjective   Gaviota Evans is a 82 y.o. female    Chief Complaint   Patient presents with   • Left knee swelling     Knee Pain    There was no injury mechanism. The pain is present in the left knee. The quality of the pain is described as aching. The pain is moderate. The pain has been worsening since onset. Pertinent negatives include no inability to bear weight, loss of motion, loss of sensation, muscle weakness, numbness or tingling. Associated symptoms comments: Swelling. She reports no foreign bodies present. The symptoms are aggravated by movement and weight bearing. She has tried acetaminophen, elevation, ice, immobilization and non-weight bearing (has seen ortho and had steriod injections) for the symptoms. The treatment provided no relief.        The following portions of the patient's history were reviewed and updated as appropriate: allergies, current medications, past family history, past medical history, past social history, past surgical history and problem list.    Current Outpatient Prescriptions:   •  aspirin 81 MG EC tablet, Take 81 mg by mouth daily., Disp: , Rfl:   •  bisoprolol-hydrochlorothiazide (ZIAC) 5-6.25 MG per tablet, Take 1 tablet by mouth Daily., Disp: 90 tablet, Rfl: 3  •  citalopram (CeleXA) 20 MG tablet, Take 1 tablet by mouth Daily., Disp: 30 tablet, Rfl: 0  •  Coenzyme Q10 (CO Q 10 PO), Take  by mouth Daily., Disp: , Rfl:   •  cyanocobalamin 1000 MCG/ML injection, Inject 1 mL into the shoulder, thigh, or buttocks Every 14 (Fourteen) Days., Disp: 6 mL, Rfl: 2  •  diclofenac (VOLTAREN) 75 MG EC tablet, Take 1 tablet by mouth 2 (Two) Times a Day As Needed (pain). Prn pain, Disp: 60 tablet, Rfl: 0  •  isosorbide mononitrate (IMDUR) 30 MG 24 hr tablet, Take 1 tablet by mouth Daily., Disp: 90 tablet, Rfl: 3  •  lidocaine (LIDODERM) 5 %, Place 1 patch on the skin Daily. Remove & Discard patch within 12 hours or as directed by MD for back pain and neuropathy, Disp: 30 patch, Rfl: 5  •   nitroglycerin (NITROSTAT) 0.4 MG SL tablet, Place 1 tablet under the tongue As Needed for Chest Pain. Prn chest pain, Disp: 25 tablet, Rfl: 11  •  pantoprazole (PROTONIX) 40 MG EC tablet, Take 1 tablet by mouth Daily., Disp: 90 tablet, Rfl: 0  •  prednisoLONE acetate (PRED FORTE) 1 % ophthalmic suspension, Administer 1 drop into the left eye 2 (two) times a week., Disp: , Rfl:   •  traMADol (ULTRAM) 50 MG tablet, Take 1 tablet by mouth Every 6 (Six) Hours As Needed for Moderate Pain ., Disp: 120 tablet, Rfl: 0  •  TURMERIC PO, Take  by mouth Daily., Disp: , Rfl:   •  vitamin E 400 UNIT capsule, Take 400 Units by mouth Daily. 3 tablets daily, Disp: , Rfl:      Review of Systems   Constitutional: Negative for chills, fatigue and fever.   Respiratory: Negative for cough, chest tightness and shortness of breath.    Cardiovascular: Negative for chest pain.   Gastrointestinal: Negative for abdominal pain, diarrhea, nausea and vomiting.   Endocrine: Negative for cold intolerance and heat intolerance.   Musculoskeletal: Positive for joint swelling (left knee). Negative for arthralgias.        Left knee pain   Neurological: Negative for dizziness, tingling and numbness.       Objective   Physical Exam   Constitutional: She is oriented to person, place, and time. She appears well-developed and well-nourished.   HENT:   Head: Normocephalic and atraumatic.   Eyes: Conjunctivae and EOM are normal. Pupils are equal, round, and reactive to light.   Neck: Normal range of motion.   Cardiovascular: Normal rate, regular rhythm and normal heart sounds.    Pulmonary/Chest: Effort normal and breath sounds normal.   Abdominal: Soft. Bowel sounds are normal.   Musculoskeletal:        Left knee: She exhibits decreased range of motion, swelling and effusion. She exhibits no ecchymosis, no deformity, no laceration, no erythema, normal alignment, no LCL laxity, normal patellar mobility, no bony tenderness, normal meniscus and no MCL laxity.  "Tenderness found. Medial joint line tenderness noted. No lateral joint line, no MCL, no LCL and no patellar tendon tenderness noted.   Neurological: She is alert and oriented to person, place, and time. She has normal reflexes.   Skin: Skin is warm and dry.   Psychiatric: She has a normal mood and affect. Her behavior is normal. Judgment and thought content normal.     Vitals:    02/23/18 1303   BP: 120/58   Pulse: 77   Resp: 16   Temp: 97.6 °F (36.4 °C)   TempSrc: Temporal Artery    SpO2: 96%   Weight: 70 kg (154 lb 6.4 oz)   Height: 157.5 cm (62.01\")         Assessment/Plan   Gaviota was seen today for left knee swelling.    Diagnoses and all orders for this visit:    Chronic pain of left knee  -     Ambulatory Referral to [facility], Evaluate and treat  -     MRI Knee Left Without Contrast; Future    Knee effusion, left  -     Ambulatory Referral to Physical Therapy [facility], Evaluate and treat  -     MRI Knee Left Without Contrast; Future      Continue with current meds  May also continue with compression sleeve she has been wearing  Referred to water therapy at [facility]  Will set up for MRI             "

## 2018-02-27 ENCOUNTER — APPOINTMENT (OUTPATIENT)
Dept: MRI IMAGING | Facility: HOSPITAL | Age: 83
End: 2018-02-27

## 2018-02-28 ENCOUNTER — HOSPITAL ENCOUNTER (OUTPATIENT)
Dept: MRI IMAGING | Facility: HOSPITAL | Age: 83
Discharge: HOME | End: 2018-02-28
Admitting: NURSE PRACTITIONER

## 2018-02-28 DIAGNOSIS — G89.29 CHRONIC PAIN OF LEFT KNEE: ICD-10-CM

## 2018-02-28 DIAGNOSIS — M25.462 KNEE EFFUSION, LEFT: ICD-10-CM

## 2018-02-28 DIAGNOSIS — M25.562 CHRONIC PAIN OF LEFT KNEE: ICD-10-CM

## 2018-02-28 PROCEDURE — 73721 MRI JNT OF LWR EXTRE W/O DYE: CPT

## 2018-03-02 ENCOUNTER — OFFICE VISIT (OUTPATIENT)
Dept: ORTHOPEDIC SURGERY | Facility: CLINIC | Age: 83
End: 2018-03-02

## 2018-03-02 VITALS
HEIGHT: 62 IN | DIASTOLIC BLOOD PRESSURE: 72 MMHG | SYSTOLIC BLOOD PRESSURE: 126 MMHG | HEART RATE: 71 BPM | BODY MASS INDEX: 27.99 KG/M2 | WEIGHT: 152.12 LBS

## 2018-03-02 DIAGNOSIS — M25.562 LEFT KNEE PAIN, UNSPECIFIED CHRONICITY: ICD-10-CM

## 2018-03-02 DIAGNOSIS — M93.262 OSTEOCHONDRITIS DISSECANS OF KNEE, LEFT: ICD-10-CM

## 2018-03-02 DIAGNOSIS — M17.12 PRIMARY OSTEOARTHRITIS OF LEFT KNEE: Primary | ICD-10-CM

## 2018-03-02 PROCEDURE — 20610 DRAIN/INJ JOINT/BURSA W/O US: CPT | Performed by: ORTHOPAEDIC SURGERY

## 2018-03-02 PROCEDURE — 99204 OFFICE O/P NEW MOD 45 MIN: CPT | Performed by: ORTHOPAEDIC SURGERY

## 2018-03-02 RX ORDER — TRIAMCINOLONE ACETONIDE 40 MG/ML
80 INJECTION, SUSPENSION INTRA-ARTICULAR; INTRAMUSCULAR
Status: COMPLETED | OUTPATIENT
Start: 2018-03-02 | End: 2018-03-02

## 2018-03-02 RX ORDER — LIDOCAINE HYDROCHLORIDE 10 MG/ML
3 INJECTION, SOLUTION INFILTRATION; PERINEURAL
Status: COMPLETED | OUTPATIENT
Start: 2018-03-02 | End: 2018-03-02

## 2018-03-02 RX ADMIN — LIDOCAINE HYDROCHLORIDE 3 ML: 10 INJECTION, SOLUTION INFILTRATION; PERINEURAL at 12:14

## 2018-03-02 RX ADMIN — TRIAMCINOLONE ACETONIDE 80 MG: 40 INJECTION, SUSPENSION INTRA-ARTICULAR; INTRAMUSCULAR at 12:14

## 2018-03-02 NOTE — PROGRESS NOTES
Procedure   Large Joint Arthrocentesis  Date/Time: 3/2/2018 12:14 PM  Consent given by: patient  Site marked: site marked  Timeout: Immediately prior to procedure a time out was called to verify the correct patient, procedure, equipment, support staff and site/side marked as required   Supporting Documentation  Indications: pain   Procedure Details  Location: knee - L knee  Preparation: Patient was prepped and draped in the usual sterile fashion  Needle size: 22 G  Approach: anterolateral  Medications administered: 3 mL lidocaine 1 %; 80 mg triamcinolone acetonide 40 MG/ML (3 cc Bupivicaine .25% NDC: 1447-7713-18; LOT: [id number]; EXP: 09/01/2019)  Aspirate amount: 20 mL  Aspirate: yellow and clear  Patient tolerance: patient tolerated the procedure well with no immediate complications

## 2018-03-02 NOTE — PROGRESS NOTES
Orthopaedic Clinic Note: Knee New Patient    Chief Complaint   Patient presents with   • Left Knee - Pain        HPI    Consult from: Angela VU MD      Gaviota Evans is a 82 y.o. female who presents with left knee pain for 3 month(s). Onset Has been atraumatic and gradual in nature.  Pain is localized globally throughout the knee but primarily along the medial joint line.  She rates the pain a 10/10 on the pain scale.  Her pain is worse with standing, walking, any weightbearing activity.  Sitting and resting improve her pain.  Previous treatments have included anti-inflammatories, tramadol, use of a cane, and oral steroids.  She also had a previous steroid injection and aspiration performed at the end of January by a physician assistant in [place].  She states that that injection provided no relief or improvement in her swelling.  She is here today to discuss treatment options for her ongoing knee pain that is limiting her daily activities.  She denies any fevers, chills, constitutional symptoms.    Past Medical History:   Diagnosis Date   • Atherosclerosis of abdominal aorta    • Atrophic vaginitis    • B12 deficiency    • Rivera's esophagus    • Cardiovascular disease    • Chronic back pain     Chronic back pain with intermittent epidural injections.   • Chronic low back pain    • Coronary artery disease    • Fibromyalgia    • Fibromyalgia    • GERD (gastroesophageal reflux disease)    • H/O mammogram 12/2014   • Hammer toe    • Hiatal hernia    • Hyperlipidemia    • Hypertension    • Lower extremity pain     Lower extremity discomfort: Right ROXANA 1.1, left ROXANA 1.1.   • Normocytic anemia due to blood loss    • Obstructive sleep apnea    • Obstructive sleep apnea treated with continuous positive airway pressure (CPAP)     Obstructive sleep apnea with CPAP compliance.   • Osteoarthritis     severe   • Palpitations     Event monitor, December 2011: Revealed brief episodes of atrial tachycardia.    • Senile  osteoporosis       Past Surgical History:   Procedure Laterality Date   • BLADDER REPAIR     • CHOLECYSTECTOMY      Status post cholecystectomy.   • COLONOSCOPY  01/2015   • CORONARY STENT PLACEMENT  05/2015    2 stents 80% RCA 12/2007, LHC 2/2011. no critical disease   • HYSTERECTOMY      partial   • LUMBAR FUSION  2006    L4-s1   • OTHER SURGICAL HISTORY      [facility]   • VAGINAL REPAIR        Family History   Problem Relation Age of Onset   • No Known Problems Sister    • Heart disease Sister    • Heart attack Sister 54   • Coronary artery disease Sister    • Hypertension Mother      Social History     Social History   • Marital status:      Spouse name: N/A   • Number of children: N/A   • Years of education: N/A     Occupational History   • Not on file.     Social History Main Topics   • Smoking status: Never Smoker   • Smokeless tobacco: Never Used   • Alcohol use No   • Drug use: No   • Sexual activity: No     Other Topics Concern   • Not on file     Social History Narrative      Current Outpatient Prescriptions on File Prior to Visit   Medication Sig Dispense Refill   • aspirin 81 MG EC tablet Take 81 mg by mouth daily.     • bisoprolol-hydrochlorothiazide (ZIAC) 5-6.25 MG per tablet Take 1 tablet by mouth Daily. 90 tablet 3   • citalopram (CeleXA) 20 MG tablet Take 1 tablet by mouth Daily. 30 tablet 0   • Coenzyme Q10 (CO Q 10 PO) Take  by mouth Daily.     • cyanocobalamin 1000 MCG/ML injection Inject 1 mL into the shoulder, thigh, or buttocks Every 14 (Fourteen) Days. 6 mL 2   • diclofenac (VOLTAREN) 75 MG EC tablet Take 1 tablet by mouth 2 (Two) Times a Day As Needed (pain). Prn pain 60 tablet 0   • isosorbide mononitrate (IMDUR) 30 MG 24 hr tablet Take 1 tablet by mouth Daily. 90 tablet 3   • lidocaine (LIDODERM) 5 % Place 1 patch on the skin Daily. Remove & Discard patch within 12 hours or as directed by MD for back pain and neuropathy 30 patch 5   • nitroglycerin (NITROSTAT) 0.4 MG SL tablet Place 1  tablet under the tongue As Needed for Chest Pain. Prn chest pain 25 tablet 11   • pantoprazole (PROTONIX) 40 MG EC tablet Take 1 tablet by mouth Daily. 90 tablet 0   • prednisoLONE acetate (PRED FORTE) 1 % ophthalmic suspension Administer 1 drop into the left eye 2 (two) times a week.     • traMADol (ULTRAM) 50 MG tablet Take 1 tablet by mouth Every 6 (Six) Hours As Needed for Moderate Pain . 120 tablet 0   • TURMERIC PO Take  by mouth Daily.     • vitamin E 400 UNIT capsule Take 400 Units by mouth Daily. 3 tablets daily       No current facility-administered medications on file prior to visit.       Allergies   Allergen Reactions   • Ambien [Zolpidem Tartrate]      amnestic   • Arthrotec [Diclofenac-Misoprostol] GI Intolerance   • Aspirin GI Intolerance   • Bextra [Valdecoxib] GI Intolerance   • Codeine GI Intolerance   • Crestor [Rosuvastatin]      Rxn not known   • Darvon [Propoxyphene] GI Intolerance   • Effient [Prasugrel] Other (See Comments)     Tongue swelling   • Lescol [Fluvastatin]      Rxn not known   • Lexapro [Escitalopram Oxalate] Nausea Only   • Macrobid [Nitrofurantoin] Other (See Comments)     Pain/cramping   • Percocet [Oxycodone-Acetaminophen]      depression   • Pravachol [Pravastatin Sodium]      Rxn not known   • Vioxx [Rofecoxib] GI Intolerance   • Welchol [Colesevelam Hcl]      Joint pain   • Statins    • Ciprofloxacin    • Lyrica [Pregabalin] GI Bleeding and GI Intolerance   • Plavix [Clopidogrel Bisulfate] Swelling     Tongue swelling   • Sulfamethoxazole-Trimethoprim Rash        Review of Systems   Constitutional: Positive for activity change, chills and fatigue.        Night sweats   HENT: Positive for dental problem, hearing loss, sinus pressure and tinnitus.    Eyes: Positive for itching.   Respiratory: Negative.    Cardiovascular: Positive for palpitations and leg swelling.   Gastrointestinal: Positive for constipation and diarrhea.   Endocrine: Positive for cold intolerance.  "  Genitourinary: Positive for urgency.   Musculoskeletal: Positive for arthralgias, back pain, joint swelling, neck pain and neck stiffness.   Skin: Negative.    Allergic/Immunologic: Positive for environmental allergies and food allergies.   Neurological: Positive for dizziness, weakness, light-headedness, numbness and headaches.   Hematological: Negative.    Psychiatric/Behavioral: Positive for decreased concentration and sleep disturbance.        The following portions of the patient's history were reviewed and updated as appropriate: allergies, current medications, past family history, past medical history, past social history, past surgical history and problem list.    Physical Exam  Blood pressure 126/72, pulse 71, height 157.5 cm (62.01\"), weight 69 kg (152 lb 1.9 oz).    Body mass index is 27.82 kg/(m^2).    GENERAL APPEARANCE: awake, alert & oriented x 3, in no acute distress and well developed, well nourished  PSYCH: normal affect  LUNGS:  breathing nonlabored  EYES: sclera anicteric  CARDIOVASCULAR: palpable dorsalis pedis, palpable posterior tibial bilaterally. Capillary refill less than 2 seconds  EXTREMITIES: no clubbing, cyanosis  GAIT:  Antalgic            Right Lower Extremity Exam:   ----------  Hip Exam  ----------  FLEXION CONTRACTURE: None  FLEXION: 110 degrees  INTERNAL ROTATION: 20 degrees at 90 degrees of flexion   EXTERNAL ROTATION: 40 degrees at 90 degrees of flexion    PAIN WITH HIP MOTION: no  ----------  Knee Exam  ----------  ALIGNMENT: neutral, no varus or valgus deformity     RANGE OF MOTION:  Normal (0-130 degrees) with no extensor lag or flexion contracture  LIGAMENTOUS STABILITY:   stable to varus and valgus stress at 0 and 30 degrees without any evidence of laxity     STRENGTH:  5/5 knee flexion, extension. 5/5 ankle dorsiflexion and plantarflexion.     PAIN WITH PALPATION: denies tenderness to palpation about the knee, denies medial or lateral joint line pain  KNEE EFFUSION: " no  PAIN WITH KNEE ROM: no  PATELLAR CREPITUS: yes, asymptomatic  SPECIAL EXAM FINDINGS:  Negative patellar compression    REFLEXES:  PATELLAR 2+/4  ACHILLES 2+/4    CLONUS: negative  STRAIGHT LEG TEST:   negative    SENSATION TO LIGHT TOUCH:  DEEP PERONEAL/SUPERFICIAL PERONEAL/SURAL/SAPHENOUS/TIBIAL:   intact    EDEMA:  no  ERYTHEMA:  no  WOUNDS/INCISIONS: none, no overlying skin problems.      Left Lower Extremity Exam:   ----------  Hip Exam  ----------  FLEXION CONTRACTURE: None  FLEXION: 110 degrees  INTERNAL ROTATION: 20 degrees at 90 degrees of flexion   EXTERNAL ROTATION: 40 degrees at 90 degrees of flexion    PAIN WITH HIP MOTION: no  ----------  Knee Exam  ----------  ALIGNMENT: Slight varus, correctible to neutral    RANGE OF MOTION:  Normal (0-130 degrees) with no extensor lag or flexion contracture  LIGAMENTOUS STABILITY:   stable to varus and valgus stress at terminal extension and 30 degrees; slight retensioning of the MCL is appreciated with valgus stress at 30 degrees consistent with medial compartment degeneration     STRENGTH:  4/5 knee flexion, extension. 5/5 ankle dorsiflexion and plantarflexion.     PAIN WITH PALPATION: medial joint line, lateral joint line, anterior knee and popliteal region  KNEE EFFUSION: yes, mild effusion  PAIN WITH KNEE ROM: yes, global but primarily medial joint line   PATELLAR CREPITUS: yes, painful and symptomatic  SPECIAL EXAM FINDINGS:  none    REFLEXES:  PATELLAR 2+/4  ACHILLES 2+/4    CLONUS: no  STRAIGHT LEG TEST:   negative    SENSATION TO LIGHT TOUCH:  DEEP PERONEAL/SUPERFICIAL PERONEAL/SURAL/SAPHENOUS/TIBIAL:   intact    EDEMA:  no  ERYTHEMA:  no  WOUNDS/INCISIONS: no      ______________________________________________________________________  ______________________________________________________________________    RADIOGRAPHIC FINDINGS:   Indication: Left knee pain    Comparison: No prior xrays are available for comparison    Left knee(s) 4 views: Radiographs  demonstrate moderate varus osteoarthritis with joint space narrowing in the medial compartment.  Irregular contour the medial femoral condyle is visualized consistent with possible osteochondritis dissecans lesion.  Small osteophyte formation is visualized along the medial compartment.    MRI of left knee from 2/28/18 was personally reviewed.  MRI demonstrates evidence of subchondral edema involving the medial femoral condyle medial tibial plateau.  A degenerative medial meniscal tear is visualized.  An osteochondritis dissecans lesion is present within the medial femoral condyle.      Assessment/Plan:   Diagnosis Plan   1. Primary osteoarthritis of left knee  Large Joint Arthrocentesis   2. Left knee pain, unspecified chronicity  XR Knee 4+ View Left   3. Osteochondritis dissecans of knee, left       I discussed with the patient treatment options for her ongoing knee pain.  I explained that based on her symptoms and findings, arthroplasty would be the only surgical option.  I discussed partial versus total knee arthroplasty.  Patient is not at all interested in surgical intervention and wishes to attempt any and all conservative treatment options before proceeding to surgery.  Therefore, I discussed with her continued oral anti-inflammatory use which she is currently on.  I also discussed with her the risks of proceeding with repeat knee aspiration and injection today given her knee effusion and ongoing pain.  Finally, I offered her an offloading knee brace to offload the medial compartment of the need to see if this decreases her inflammation.  She is agreeable to all these treatment options.  I'll see her back in 3 months for repeat assessment.    Procedure Note:  I discussed with the patient the potential benefits of performing a therapeutic aspiration and injection of the left knee as well as potential risks including but not limited to infection, swelling, pain, bleeding, bruising, nerve/vessel damage, skin  color changes, transient elevation in blood glucose levels, and fat atrophy. After informed consent and after the area was prepped with alcohol, ethyl chloride was used to numb the skin. Via the superolateral approach, an 18-gauge needle was inserted into the knee joint.  25 cc of clear yellow joint fluid were aspirated from the knee.  Then, 3cc of 1% lidocaine, 3cc of 0.25% marcaine and 2 cc of 40mg/ml of Kenalog were injected into the left knee. The patient tolerated the procedure well. There were no complications. A sterile dressing was placed over the injection site.      Jarrod Powell MD  03/02/18  12:26 PM

## 2018-03-07 ENCOUNTER — TELEPHONE (OUTPATIENT)
Dept: INTERNAL MEDICINE | Facility: CLINIC | Age: 83
End: 2018-03-07

## 2018-03-07 NOTE — TELEPHONE ENCOUNTER
----- Message from DIANE Zepeda sent at 3/4/2018  9:33 PM EST -----  MRI of the left knee shows a tear of the meniscus, a bone bruise, and a benign appearing mass.  I definitely want her to see another ortho MD for a 2nd opinion.  I can refer if she agrees.

## 2018-03-12 NOTE — TELEPHONE ENCOUNTER
I have left patient 3 msg's and have not heard back from patient. Patient is scheduled to see Dr. Sorto tomorrow 3/12/2018.

## 2018-03-13 NOTE — TELEPHONE ENCOUNTER
Just an FYI    I had seen pt for f/o knee pain, sent for MRI and we have tried to contact pt without success.  She has an upcoming appt with you.

## 2018-03-27 ENCOUNTER — OFFICE VISIT (OUTPATIENT)
Dept: INTERNAL MEDICINE | Facility: CLINIC | Age: 83
End: 2018-03-27

## 2018-03-27 VITALS
SYSTOLIC BLOOD PRESSURE: 122 MMHG | WEIGHT: 151.8 LBS | DIASTOLIC BLOOD PRESSURE: 64 MMHG | HEIGHT: 62 IN | TEMPERATURE: 98.1 F | OXYGEN SATURATION: 97 % | HEART RATE: 72 BPM | BODY MASS INDEX: 27.94 KG/M2

## 2018-03-27 DIAGNOSIS — R31.9 URINARY TRACT INFECTION WITH HEMATURIA, SITE UNSPECIFIED: Primary | ICD-10-CM

## 2018-03-27 DIAGNOSIS — R30.0 DYSURIA: ICD-10-CM

## 2018-03-27 DIAGNOSIS — N39.0 URINARY TRACT INFECTION WITH HEMATURIA, SITE UNSPECIFIED: Primary | ICD-10-CM

## 2018-03-27 LAB
BILIRUB BLD-MCNC: ABNORMAL MG/DL
CLARITY, POC: CLEAR
COLOR UR: ABNORMAL
GLUCOSE UR STRIP-MCNC: NEGATIVE MG/DL
KETONES UR QL: NEGATIVE
LEUKOCYTE EST, POC: ABNORMAL
NITRITE UR-MCNC: POSITIVE MG/ML
PH UR: 5 [PH] (ref 5–8)
PROT UR STRIP-MCNC: NEGATIVE MG/DL
RBC # UR STRIP: ABNORMAL /UL
SP GR UR: 1.02 (ref 1–1.03)
UROBILINOGEN UR QL: ABNORMAL

## 2018-03-27 PROCEDURE — 81003 URINALYSIS AUTO W/O SCOPE: CPT | Performed by: NURSE PRACTITIONER

## 2018-03-27 PROCEDURE — 99214 OFFICE O/P EST MOD 30 MIN: CPT | Performed by: NURSE PRACTITIONER

## 2018-03-27 RX ORDER — LEVOFLOXACIN 500 MG/1
500 TABLET, FILM COATED ORAL DAILY
Qty: 7 TABLET | Refills: 0 | Status: SHIPPED | OUTPATIENT
Start: 2018-03-27 | End: 2018-04-04

## 2018-03-27 NOTE — PROGRESS NOTES
Subjective   Gaviota Evans is a 82 y.o. female    Chief Complaint   Patient presents with   • Urinary Tract Infection     pain and pressure on urination.  2 days.  AZO test was positive.     Urinary Tract Infection    This is a new problem. The current episode started yesterday. The problem occurs every urination. The problem has been unchanged. The quality of the pain is described as aching and burning. The pain is mild. There has been no fever. She is not sexually active. There is no history of pyelonephritis. Associated symptoms include frequency and urgency. Pertinent negatives include no chills, discharge, flank pain, hematuria, hesitancy, nausea, possible pregnancy, sweats or vomiting. Treatments tried: azo. The treatment provided mild relief. Her past medical history is significant for recurrent UTIs. There is no history of catheterization, kidney stones, a single kidney, urinary stasis or a urological procedure.        The following portions of the patient's history were reviewed and updated as appropriate: allergies, current medications, past family history, past medical history, past social history, past surgical history and problem list.    Current Outpatient Prescriptions:   •  aspirin 81 MG EC tablet, Take 81 mg by mouth daily., Disp: , Rfl:   •  bisoprolol-hydrochlorothiazide (ZIAC) 5-6.25 MG per tablet, Take 1 tablet by mouth Daily., Disp: 90 tablet, Rfl: 3  •  citalopram (CeleXA) 20 MG tablet, Take 1 tablet by mouth Daily., Disp: 30 tablet, Rfl: 0  •  Coenzyme Q10 (CO Q 10 PO), Take  by mouth Daily., Disp: , Rfl:   •  cyanocobalamin 1000 MCG/ML injection, Inject 1 mL into the shoulder, thigh, or buttocks Every 14 (Fourteen) Days., Disp: 6 mL, Rfl: 2  •  diclofenac (VOLTAREN) 75 MG EC tablet, Take 1 tablet by mouth 2 (Two) Times a Day As Needed (pain). Prn pain, Disp: 60 tablet, Rfl: 0  •  isosorbide mononitrate (IMDUR) 30 MG 24 hr tablet, Take 1 tablet by mouth Daily., Disp: 90 tablet, Rfl: 3  •   lidocaine (LIDODERM) 5 %, Place 1 patch on the skin Daily. Remove & Discard patch within 12 hours or as directed by MD for back pain and neuropathy, Disp: 30 patch, Rfl: 5  •  nitroglycerin (NITROSTAT) 0.4 MG SL tablet, Place 1 tablet under the tongue As Needed for Chest Pain. Prn chest pain, Disp: 25 tablet, Rfl: 11  •  pantoprazole (PROTONIX) 40 MG EC tablet, Take 1 tablet by mouth Daily., Disp: 90 tablet, Rfl: 0  •  prednisoLONE acetate (PRED FORTE) 1 % ophthalmic suspension, Administer 1 drop into the left eye 2 (two) times a week., Disp: , Rfl:   •  traMADol (ULTRAM) 50 MG tablet, Take 1 tablet by mouth Every 6 (Six) Hours As Needed for Moderate Pain ., Disp: 120 tablet, Rfl: 0  •  TURMERIC PO, Take  by mouth Daily., Disp: , Rfl:   •  vitamin E 400 UNIT capsule, Take 400 Units by mouth Daily. 3 tablets daily, Disp: , Rfl:   •  levoFLOXacin (LEVAQUIN) 500 MG tablet, Take 1 tablet by mouth Daily for 7 days., Disp: 7 tablet, Rfl: 0     Review of Systems   Constitutional: Negative for chills, fatigue and fever.   Respiratory: Negative for cough, chest tightness and shortness of breath.    Cardiovascular: Negative for chest pain.   Gastrointestinal: Negative for abdominal pain, diarrhea, nausea and vomiting.   Endocrine: Negative for cold intolerance and heat intolerance.   Genitourinary: Positive for frequency and urgency. Negative for flank pain, hematuria and hesitancy.   Musculoskeletal: Negative for arthralgias.   Neurological: Negative for dizziness.       Objective   Physical Exam   Constitutional: She is oriented to person, place, and time. She appears well-developed and well-nourished.   HENT:   Head: Normocephalic and atraumatic.   Eyes: Conjunctivae and EOM are normal. Pupils are equal, round, and reactive to light.   Neck: Normal range of motion.   Cardiovascular: Normal rate, regular rhythm and normal heart sounds.    Pulmonary/Chest: Effort normal and breath sounds normal.   Abdominal: Soft. Bowel  "sounds are normal.   Musculoskeletal: Normal range of motion.   Neurological: She is alert and oriented to person, place, and time. She has normal reflexes.   Skin: Skin is warm and dry.   Psychiatric: She has a normal mood and affect. Her behavior is normal. Judgment and thought content normal.     Vitals:    03/27/18 1247   BP: 122/64   Pulse: 72   Temp: 98.1 °F (36.7 °C)   SpO2: 97%   Weight: 68.9 kg (151 lb 12.8 oz)   Height: 157.5 cm (62.01\")         Assessment/Plan   Gaviota was seen today for urinary tract infection.    Diagnoses and all orders for this visit:    Urinary tract infection with hematuria, site unspecified  -     levoFLOXacin (LEVAQUIN) 500 MG tablet; Take 1 tablet by mouth Daily for 7 days.  -     Urine Culture - Urine, Urine, Clean Catch    Dysuria  -     POCT urinalysis dipstick, automated      Med as directed  Urine sent for culture  Increase fluids  RTC if sx's worsen or do not improve    Pt requested a refill on pain medication, Tramadol while in office.  Discussed with pt that she will have to see [facility] for f/u before this can be refilled.  She will schedule a f/u at check out today.             "

## 2018-03-29 LAB
BACTERIA UR CULT: NORMAL
BACTERIA UR CULT: NORMAL

## 2018-04-04 ENCOUNTER — OFFICE VISIT (OUTPATIENT)
Dept: INTERNAL MEDICINE | Facility: CLINIC | Age: 83
End: 2018-04-04

## 2018-04-04 VITALS
HEIGHT: 62 IN | TEMPERATURE: 96.9 F | BODY MASS INDEX: 28.08 KG/M2 | WEIGHT: 152.6 LBS | DIASTOLIC BLOOD PRESSURE: 74 MMHG | SYSTOLIC BLOOD PRESSURE: 118 MMHG | OXYGEN SATURATION: 97 % | HEART RATE: 86 BPM

## 2018-04-04 DIAGNOSIS — M54.41 CHRONIC MIDLINE LOW BACK PAIN WITH BILATERAL SCIATICA: ICD-10-CM

## 2018-04-04 DIAGNOSIS — F41.9 ANXIETY: ICD-10-CM

## 2018-04-04 DIAGNOSIS — M25.562 CHRONIC PAIN OF LEFT KNEE: ICD-10-CM

## 2018-04-04 DIAGNOSIS — M54.42 CHRONIC MIDLINE LOW BACK PAIN WITH BILATERAL SCIATICA: ICD-10-CM

## 2018-04-04 DIAGNOSIS — F43.21 ADJUSTMENT REACTION WITH PROLONGED DEPRESSIVE REACTION: Primary | ICD-10-CM

## 2018-04-04 DIAGNOSIS — G89.29 CHRONIC MIDLINE LOW BACK PAIN WITH BILATERAL SCIATICA: ICD-10-CM

## 2018-04-04 DIAGNOSIS — R14.3 FLATUS: ICD-10-CM

## 2018-04-04 DIAGNOSIS — G89.29 CHRONIC PAIN OF LEFT KNEE: ICD-10-CM

## 2018-04-04 PROCEDURE — 99214 OFFICE O/P EST MOD 30 MIN: CPT | Performed by: FAMILY MEDICINE

## 2018-04-04 RX ORDER — SIMETHICONE 125 MG
125 TABLET,CHEWABLE ORAL EVERY 6 HOURS PRN
Qty: 60 TABLET | Refills: 1 | Status: SHIPPED | OUTPATIENT
Start: 2018-04-04 | End: 2021-07-21

## 2018-04-04 RX ORDER — CITALOPRAM 20 MG/1
20 TABLET ORAL DAILY
Qty: 30 TABLET | Refills: 2 | Status: SHIPPED | OUTPATIENT
Start: 2018-04-04 | End: 2018-07-11 | Stop reason: SDUPTHER

## 2018-04-04 RX ORDER — TRAMADOL HYDROCHLORIDE 50 MG/1
50 TABLET ORAL EVERY 6 HOURS PRN
Qty: 120 TABLET | Refills: 2 | Status: SHIPPED | OUTPATIENT
Start: 2018-04-04 | End: 2018-08-13 | Stop reason: SDUPTHER

## 2018-04-04 NOTE — PROGRESS NOTES
"Subjective   Gaviota Evans is a 82 y.o. female.     Chief Complaint   Patient presents with   • Hypertension     follow up on med refills   • Hyperlipidemia   • Anxiety   • Med Refill       Visit Vitals  /74   Pulse 86   Temp 96.9 °F (36.1 °C)   Ht 157.5 cm (62.01\")   Wt 69.2 kg (152 lb 9.6 oz)   LMP  (LMP Unknown)   SpO2 97%   BMI 27.90 kg/m²       Anxiety   Presents for follow-up visit. Symptoms include depressed mood (intermittent). Patient reports no chest pain, compulsions, confusion, decreased concentration, dizziness, dry mouth, excessive worry, feeling of choking, hyperventilation, insomnia, irritability, malaise, muscle tension, nausea, nervous/anxious behavior, obsessions, palpitations, panic, restlessness, shortness of breath or suicidal ideas. Symptoms occur most days. The quality of sleep is fair. Nighttime awakenings: early a.m. for rest of night.     Compliance with medications is %.   Knee Pain    There was no injury mechanism. The pain is present in the left knee. The quality of the pain is described as aching and burning. The pain is at a severity of 10/10. The pain is severe. The pain has been fluctuating since onset. Associated symptoms include an inability to bear weight. Pertinent negatives include no loss of motion, loss of sensation, muscle weakness, numbness or tingling. She reports no foreign bodies present. The symptoms are aggravated by weight bearing. She has tried non-weight bearing and NSAIDs (tramadol and injections) for the symptoms. The treatment provided moderate relief.   Back Pain   This is a chronic problem. The current episode started more than 1 year ago. The problem occurs constantly. The problem is unchanged. The pain is present in the lumbar spine. The pain is at a severity of 10/10. The pain is worse during the night. The symptoms are aggravated by lying down. Associated symptoms include paresthesias. Pertinent negatives include no abdominal pain, bladder " incontinence, bowel incontinence, chest pain, dysuria, fever, headaches, leg pain, numbness, paresis, pelvic pain, perianal numbness, tingling, weakness or weight loss. Risk factors include menopause. She has tried analgesics and NSAIDs for the symptoms. The treatment provided moderate relief.      Pt is not tolerating the steroid injection with hives.  Pt has meniscal tear on knees.  Pt is taking tramadol.  Pt did see Ortho and had the fluid aspirated.     The following portions of the patient's history were reviewed and updated as appropriate: allergies, current medications, past family history, past medical history, past social history, past surgical history and problem list.    Past Medical History:   Diagnosis Date   • Atherosclerosis of abdominal aorta    • Atrophic vaginitis    • B12 deficiency    • Rivera's esophagus    • Cardiovascular disease    • Chronic back pain     Chronic back pain with intermittent epidural injections.   • Chronic low back pain    • Coronary artery disease    • Fibromyalgia    • Fibromyalgia    • GERD (gastroesophageal reflux disease)    • H/O mammogram 12/2014   • Hammer toe    • Hiatal hernia    • Hyperlipidemia    • Hypertension    • Lower extremity pain     Lower extremity discomfort: Right ROXANA 1.1, left ROXANA 1.1.   • Normocytic anemia due to blood loss    • Obstructive sleep apnea    • Obstructive sleep apnea treated with continuous positive airway pressure (CPAP)     Obstructive sleep apnea with CPAP compliance.   • Osteoarthritis     severe   • Palpitations     Event monitor, December 2011: Revealed brief episodes of atrial tachycardia.    • Senile osteoporosis      Past Surgical History:   Procedure Laterality Date   • BLADDER REPAIR     • CHOLECYSTECTOMY      Status post cholecystectomy.   • COLONOSCOPY  01/2015   • CORONARY STENT PLACEMENT  05/2015    2 stents 80% RCA 12/2007, LHC 2/2011. no critical disease   • HYSTERECTOMY      partial   • LUMBAR FUSION  2006    L4-s1   •  OTHER SURGICAL HISTORY      [facility]   • VAGINAL REPAIR       Allergies   Allergen Reactions   • Ambien [Zolpidem Tartrate]      amnestic   • Arthrotec [Diclofenac-Misoprostol] GI Intolerance   • Aspirin GI Intolerance   • Bextra [Valdecoxib] GI Intolerance   • Codeine GI Intolerance   • Crestor [Rosuvastatin]      Rxn not known   • Darvon [Propoxyphene] GI Intolerance   • Effient [Prasugrel] Other (See Comments)     Tongue swelling   • Lescol [Fluvastatin]      Rxn not known   • Lexapro [Escitalopram Oxalate] Nausea Only   • Macrobid [Nitrofurantoin] Other (See Comments)     Pain/cramping   • Percocet [Oxycodone-Acetaminophen]      depression   • Pravachol [Pravastatin Sodium]      Rxn not known   • Vioxx [Rofecoxib] GI Intolerance   • Welchol [Colesevelam Hcl]      Joint pain   • Statins    • Ciprofloxacin    • Lyrica [Pregabalin] GI Bleeding and GI Intolerance   • Plavix [Clopidogrel Bisulfate] Swelling     Tongue swelling   • Sulfamethoxazole-Trimethoprim Rash     Family History   Problem Relation Age of Onset   • No Known Problems Sister    • Heart disease Sister    • Heart attack Sister 54   • Coronary artery disease Sister    • Hypertension Mother      Social History     Social History   • Marital status:      Spouse name: N/A   • Number of children: N/A   • Years of education: N/A     Occupational History   • Not on file.     Social History Main Topics   • Smoking status: Never Smoker   • Smokeless tobacco: Never Used   • Alcohol use No   • Drug use: No   • Sexual activity: No     Other Topics Concern   • Not on file     Social History Narrative   • No narrative on file       Review of Systems   Constitutional: Positive for chills and fatigue. Negative for diaphoresis, fever, irritability and weight loss.   HENT: Positive for rhinorrhea. Negative for ear pain, nosebleeds, postnasal drip, sinus pressure, sneezing and sore throat.    Eyes: Negative.  Negative for redness and itching.   Respiratory: Negative.   Negative for cough, shortness of breath and wheezing.    Cardiovascular: Negative.  Negative for chest pain and palpitations.   Gastrointestinal: Negative.  Negative for abdominal pain, bowel incontinence, constipation, diarrhea, nausea and vomiting.        Flatus     Endocrine: Positive for cold intolerance. Negative for heat intolerance.   Genitourinary: Negative.  Negative for bladder incontinence, dysuria, frequency, hematuria, pelvic pain and urgency.   Musculoskeletal: Positive for arthralgias (left knee pain and meniscal tear) and back pain. Negative for neck pain.   Skin: Negative.  Negative for color change and rash.   Allergic/Immunologic: Negative.  Negative for environmental allergies.   Neurological: Positive for paresthesias. Negative for dizziness, tingling, syncope, weakness, light-headedness, numbness and headaches.   Hematological: Negative.  Negative for adenopathy. Does not bruise/bleed easily.   Psychiatric/Behavioral: Positive for dysphoric mood. Negative for confusion, decreased concentration and suicidal ideas. The patient is not nervous/anxious and does not have insomnia.        Objective   Physical Exam   Constitutional: She is oriented to person, place, and time. She appears well-developed.   HENT:   Head: Normocephalic.   Right Ear: External ear normal.   Left Ear: External ear normal.   Nose: Nose normal.   Mouth/Throat: Oropharynx is clear and moist.   Eyes: Conjunctivae and EOM are normal. Pupils are equal, round, and reactive to light.   Neck: Normal range of motion. Neck supple.   Cardiovascular: Normal rate and regular rhythm.    No murmur heard.  Pulmonary/Chest: Effort normal and breath sounds normal. No respiratory distress. She has no decreased breath sounds. She has no wheezes. She has no rhonchi. She has no rales. She exhibits no tenderness.   Abdominal: Soft. Bowel sounds are normal. There is no tenderness.   Musculoskeletal: Normal range of motion.        Left knee: She  exhibits effusion. Tenderness found. Lateral joint line tenderness noted.        Lumbar back: She exhibits tenderness and spasm.   Neurological: She is alert and oriented to person, place, and time.   Skin: Skin is warm and dry.   Psychiatric: She has a normal mood and affect. Her behavior is normal.   Nursing note and vitals reviewed.      Assessment/Plan   Gaviota was seen today for hypertension, hyperlipidemia, anxiety and med refill.    Diagnoses and all orders for this visit:    Adjustment reaction with prolonged depressive reaction  -     Ambulatory Referral to Behavioral Health    Chronic midline low back pain with bilateral sciatica  -     traMADol (ULTRAM) 50 MG tablet; Take 1 tablet by mouth Every 6 (Six) Hours As Needed for Moderate Pain .    Chronic pain of left knee    Flatus  -     simethicone (PHAZYME) 125 MG chewable tablet; Chew 1 tablet Every 6 (Six) Hours As Needed for Flatulence.    Anxiety  -     Ambulatory Referral to Behavioral Health    Other orders  -     citalopram (CeleXA) 20 MG tablet; Take 1 tablet by mouth Daily.      Reviewed MRI left knee with pt meniscal tear, pt's ortho is aware.               Current Outpatient Prescriptions:   •  aspirin 81 MG EC tablet, Take 81 mg by mouth daily., Disp: , Rfl:   •  bisoprolol-hydrochlorothiazide (ZIAC) 5-6.25 MG per tablet, Take 1 tablet by mouth Daily., Disp: 90 tablet, Rfl: 3  •  citalopram (CeleXA) 20 MG tablet, Take 1 tablet by mouth Daily., Disp: 30 tablet, Rfl: 2  •  Coenzyme Q10 (CO Q 10 PO), Take  by mouth Daily., Disp: , Rfl:   •  cyanocobalamin 1000 MCG/ML injection, Inject 1 mL into the shoulder, thigh, or buttocks Every 14 (Fourteen) Days., Disp: 6 mL, Rfl: 2  •  diclofenac (VOLTAREN) 75 MG EC tablet, Take 1 tablet by mouth 2 (Two) Times a Day As Needed (pain). Prn pain, Disp: 60 tablet, Rfl: 0  •  isosorbide mononitrate (IMDUR) 30 MG 24 hr tablet, Take 1 tablet by mouth Daily., Disp: 90 tablet, Rfl: 3  •  nitroglycerin (NITROSTAT) 0.4  MG SL tablet, Place 1 tablet under the tongue As Needed for Chest Pain. Prn chest pain, Disp: 25 tablet, Rfl: 11  •  pantoprazole (PROTONIX) 40 MG EC tablet, Take 1 tablet by mouth Daily., Disp: 90 tablet, Rfl: 0  •  prednisoLONE acetate (PRED FORTE) 1 % ophthalmic suspension, Administer 1 drop into the left eye 2 (two) times a week., Disp: , Rfl:   •  traMADol (ULTRAM) 50 MG tablet, Take 1 tablet by mouth Every 6 (Six) Hours As Needed for Moderate Pain ., Disp: 120 tablet, Rfl: 2  •  TURMERIC PO, Take  by mouth Daily., Disp: , Rfl:   •  vitamin E 400 UNIT capsule, Take 400 Units by mouth Daily. 3 tablets daily, Disp: , Rfl:   •  simethicone (PHAZYME) 125 MG chewable tablet, Chew 1 tablet Every 6 (Six) Hours As Needed for Flatulence., Disp: 60 tablet, Rfl: 1    Return in about 3 months (around 7/4/2018), or if symptoms worsen or fail to improve, for Recheck.

## 2018-04-18 ENCOUNTER — TELEPHONE (OUTPATIENT)
Dept: INTERNAL MEDICINE | Facility: CLINIC | Age: 83
End: 2018-04-18

## 2018-04-18 NOTE — TELEPHONE ENCOUNTER
PATIENT ONLY GOT A 7 DAY SUPPLY OF MEDICATION FOR UTI AND SHE USUALLY GETS A 10 DAY SUPPLY. SHE IS NOW ASKING FOR 3 MORE DAYS WORTH.

## 2018-04-18 NOTE — TELEPHONE ENCOUNTER
Is she having symptoms, because the usual dose of levaquin is for 5-7 days, unlike some of the other antibiotics that are 10 day

## 2018-04-25 NOTE — TELEPHONE ENCOUNTER
Notified patient of results. She stated she cannot handle the cipro, but doesn't think she's tried the levoflaxin before. She stated she's been hurting badly, but thinks her urine looks better after taking some azo. She's been taking some of the doxycycline that she had at home. I advised her to try the antibiotic that Dr Sorto sends in and discontinue the doxy since it is not listed on the susceptibility study.   - - -

## 2018-05-16 ENCOUNTER — TELEPHONE (OUTPATIENT)
Dept: INTERNAL MEDICINE | Facility: CLINIC | Age: 83
End: 2018-05-16

## 2018-05-16 DIAGNOSIS — G89.29 CHRONIC PAIN OF LEFT KNEE: Primary | ICD-10-CM

## 2018-05-16 DIAGNOSIS — M25.562 CHRONIC PAIN OF LEFT KNEE: Primary | ICD-10-CM

## 2018-05-16 NOTE — TELEPHONE ENCOUNTER
KNEE PAIN IS STILL KEEPING HER FROM BEING ACTIVE WANTS TO BE REFERRED TO RHEUMATOLOGY CALL ON CELL [phone]

## 2018-05-20 NOTE — TELEPHONE ENCOUNTER
Pt has OA, which is generally treated by Ortho rather than Rheumatology.Does she want 2nd opinion?

## 2018-06-05 ENCOUNTER — OFFICE VISIT (OUTPATIENT)
Dept: ORTHOPEDIC SURGERY | Facility: CLINIC | Age: 83
End: 2018-06-05

## 2018-06-05 VITALS — HEART RATE: 99 BPM | BODY MASS INDEX: 28.48 KG/M2 | OXYGEN SATURATION: 95 % | WEIGHT: 154.76 LBS | HEIGHT: 62 IN

## 2018-06-05 DIAGNOSIS — M17.12 PRIMARY OSTEOARTHRITIS OF LEFT KNEE: Primary | ICD-10-CM

## 2018-06-05 PROCEDURE — 99213 OFFICE O/P EST LOW 20 MIN: CPT | Performed by: PHYSICIAN ASSISTANT

## 2018-06-05 RX ORDER — LIDOCAINE 50 MG/G
1 PATCH TOPICAL DAILY
Qty: 30 PATCH | Refills: 5 | Status: SHIPPED | OUTPATIENT
Start: 2018-06-05 | End: 2018-10-24

## 2018-06-05 NOTE — PROGRESS NOTES
[facility] [place]    Subjective     Patient: Gaviota Evans  : 1935    Primary Care Provider: Angela Sorto MD    Requesting Provider: As above    Follow-up of the Left Knee (3 month f/u)      History    Chief Complaint: Left knee pain    History of Present Illness: Patient returns today to discuss her increasing left knee pain.  She has known left knee arthritis.  She complains of daily functional and night pain.  She rates the pain to be 10/10.  She has been treated in the past with anti-inflammatories, medial off  brace, tramadol, oral steroids as well as intra-articular steroid injection.  She reports steroid injection given to her by Dr. Powell and 2017 improved her knee pain, however, she had an allergic reaction causing rash.  She reports she is not interested in cervical surgical intervention at this time and like to exhaust all conservative treatment.  The medial off  brace did not work and was uncomfortable for her as well.    Current Outpatient Prescriptions on File Prior to Visit   Medication Sig Dispense Refill   • aspirin 81 MG EC tablet Take 81 mg by mouth daily.     • bisoprolol-hydrochlorothiazide (ZIAC) 5-6.25 MG per tablet Take 1 tablet by mouth Daily. 90 tablet 3   • citalopram (CeleXA) 20 MG tablet Take 1 tablet by mouth Daily. 30 tablet 2   • Coenzyme Q10 (CO Q 10 PO) Take  by mouth Daily.     • cyanocobalamin 1000 MCG/ML injection Inject 1 mL into the shoulder, thigh, or buttocks Every 14 (Fourteen) Days. 6 mL 2   • diclofenac (VOLTAREN) 75 MG EC tablet Take 1 tablet by mouth 2 (Two) Times a Day As Needed (pain). Prn pain 60 tablet 0   • isosorbide mononitrate (IMDUR) 30 MG 24 hr tablet Take 1 tablet by mouth Daily. 90 tablet 3   • nitroglycerin (NITROSTAT) 0.4 MG SL tablet Place 1 tablet under the tongue As Needed for Chest Pain. Prn chest pain 25 tablet 11   • pantoprazole (PROTONIX) 40 MG EC tablet Take 1 tablet by mouth Daily. 90  tablet 0   • prednisoLONE acetate (PRED FORTE) 1 % ophthalmic suspension Administer 1 drop into the left eye 2 (two) times a week.     • simethicone (PHAZYME) 125 MG chewable tablet Chew 1 tablet Every 6 (Six) Hours As Needed for Flatulence. 60 tablet 1   • traMADol (ULTRAM) 50 MG tablet Take 1 tablet by mouth Every 6 (Six) Hours As Needed for Moderate Pain . 120 tablet 2   • TURMERIC PO Take  by mouth Daily.     • vitamin E 400 UNIT capsule Take 400 Units by mouth Daily. 3 tablets daily       No current facility-administered medications on file prior to visit.       Allergies   Allergen Reactions   • Ambien [Zolpidem Tartrate]      amnestic   • Arthrotec [Diclofenac-Misoprostol] GI Intolerance   • Aspirin GI Intolerance   • Bextra [Valdecoxib] GI Intolerance   • Codeine GI Intolerance   • Crestor [Rosuvastatin]      Rxn not known   • Darvon [Propoxyphene] GI Intolerance   • Effient [Prasugrel] Other (See Comments)     Tongue swelling   • Lescol [Fluvastatin]      Rxn not known   • Lexapro [Escitalopram Oxalate] Nausea Only   • Macrobid [Nitrofurantoin] Other (See Comments)     Pain/cramping   • Percocet [Oxycodone-Acetaminophen]      depression   • Pravachol [Pravastatin Sodium]      Rxn not known   • Vioxx [Rofecoxib] GI Intolerance   • Welchol [Colesevelam Hcl]      Joint pain   • Statins    • Ciprofloxacin    • Lyrica [Pregabalin] GI Bleeding and GI Intolerance   • Plavix [Clopidogrel Bisulfate] Swelling     Tongue swelling   • Sulfamethoxazole-Trimethoprim Rash      Past Medical History:   Diagnosis Date   • Atherosclerosis of abdominal aorta    • Atrophic vaginitis    • B12 deficiency    • Rivera's esophagus    • Cardiovascular disease    • Chronic back pain     Chronic back pain with intermittent epidural injections.   • Chronic low back pain    • Coronary artery disease    • Fibromyalgia    • Fibromyalgia    • GERD (gastroesophageal reflux disease)    • H/O mammogram 12/2014   • Hammer toe    • Hiatal  hernia    • Hyperlipidemia    • Hypertension    • Lower extremity pain     Lower extremity discomfort: Right ROXANA 1.1, left ROXANA 1.1.   • Normocytic anemia due to blood loss    • Obstructive sleep apnea    • Obstructive sleep apnea treated with continuous positive airway pressure (CPAP)     Obstructive sleep apnea with CPAP compliance.   • Osteoarthritis     severe   • Palpitations     Event monitor, December 2011: Revealed brief episodes of atrial tachycardia.    • Senile osteoporosis      Past Surgical History:   Procedure Laterality Date   • BLADDER REPAIR     • CHOLECYSTECTOMY      Status post cholecystectomy.   • COLONOSCOPY  01/2015   • CORONARY STENT PLACEMENT  05/2015    2 stents 80% RCA 12/2007, LHC 2/2011. no critical disease   • HYSTERECTOMY      partial   • LUMBAR FUSION  2006    L4-s1   • OTHER SURGICAL HISTORY      [facility]   • VAGINAL REPAIR       Family History   Problem Relation Age of Onset   • No Known Problems Sister    • Heart disease Sister    • Heart attack Sister 54   • Coronary artery disease Sister    • Hypertension Mother       Social History     Social History   • Marital status:      Spouse name: N/A   • Number of children: N/A   • Years of education: N/A     Occupational History   • Not on file.     Social History Main Topics   • Smoking status: Never Smoker   • Smokeless tobacco: Never Used   • Alcohol use No   • Drug use: No   • Sexual activity: No     Other Topics Concern   • Not on file     Social History Narrative   • No narrative on file        Review of Systems   Constitutional: Negative.    HENT: Negative.    Eyes: Negative.    Respiratory: Negative.    Cardiovascular: Negative.    Gastrointestinal: Negative.    Endocrine: Negative.    Genitourinary: Negative.    Musculoskeletal: Positive for arthralgias.   Skin: Negative.    Allergic/Immunologic: Negative.    Neurological: Negative.    Hematological: Negative.    Psychiatric/Behavioral: Negative.        The following portions  "of the patient's history were reviewed and updated as appropriate: allergies, current medications, past family history, past medical history, past social history, past surgical history and problem list.      Objective      Physical Exam  Pulse 99   Ht 157.5 cm (62.01\")   Wt 70.2 kg (154 lb 12.2 oz)   LMP  (LMP Unknown)   SpO2 95%   BMI 28.30 kg/m²     Body mass index is 28.3 kg/m².    GENERAL: Body habitus: normal weight for height    Lower extremity edema: Left: trace; Right: trace    Varicose veins:  Left: mild; Right: mild    Gait: antalgic     Mental Status:  awake and alert; oriented to person, place, and time    Voice:  clear  SKIN:  Normal    Hair Growth:  Right:normal; Left:  normal  HEENT: Head: Normocephalic, atraumatic,  without obvious abnormality.  eye: normal external eye, no icterus   PULM:  Repiratory effort normal    Ortho Exam  Left Hip Exam  ---------  FLEXION CONTRACTURE: None  FLEXION: 110 degrees  INTERNAL ROTATION: 20 degrees at 90 degrees of flexion   EXTERNAL ROTATION: 40 degrees at 90 degrees of flexion    PAIN WITH HIP MOTION: no      Left Knee Exam  ----------  Knee Exam:  ----------  ALIGNMENT:  Left: Varus correctable to neutral   ----------  RANGE OF MOTION:  Left: Normal (0-130 degrees) with no extensor lag or flexion contracture  LIGAMENTOUS STABILITY:   Left:stable to varus and valgus stress at terminal extension and 30 degrees; slight retensioning of the MCL is appreciated with valgus stress at 30 degrees consistent with medial compartment degeneration   ----------  STRENGTH:  KNEE FLEXION  Left 4/5  KNEE EXTENSION Left 4/5  ----------  PAIN WITH PALPATION: Left medial joint line  PAIN WITH KNEE ROM:  Left no  PATELLAR CREPITUS:  Left yes  ----------  SENSATION TO LIGHT TOUCH:  DEEP PERONEAL/SUPERFICIAL PERONEAL/SURAL/SAPHENOUS/TIBIAL:   Left intact  ----------  EFFUSION:   Left:  no  ERYTHEMA:  ; Left:  no  WOUNDS/INCISIONS: none, no overlying skin problems.      Medical " Decision Making    Data Review:   none    Assessment:  1. Primary osteoarthritis of left knee        Plan:  1.  Left knee arthritis.  I reviewed prior x-rays, clinical findings past and current treatment with the patient.  On exam, she has medial joint line tenderness with varus deformity and no evidence of ligament or meniscal pathology.  Patient has been treated with extensive conservative treatment including anti-inflammatories, oral steroids, tramadol, intra-articular steroid injection, medial off  brace with persisting pain.  We discussed further conservative treatment including trying a round of viscous supplementation.  She reports she has had friends that have had these injections with good relief and she would like to give it a try.  I explained to her that they often works best for people with mild to moderate arthritis.  I also told her it can take several weeks following the last injection to get whatever optimal relief she may give from the injections.  Plan today is to get hyaluronic acid injections preauthorized for the left knee.  Given her prior reaction to steroid injection, I would not recommend repeat intra-articular steroid injection.  There is not significant effusion on the knee today require aspiration.  She'll return after preauthorization to begin the series.      Marylou Sheehan PA-C  06/06/18  9:06 AM

## 2018-06-08 ENCOUNTER — TELEPHONE (OUTPATIENT)
Dept: CARDIOLOGY | Facility: CLINIC | Age: 83
End: 2018-06-08

## 2018-06-08 DIAGNOSIS — Z79.899 HIGH RISK MEDICATION USE: Primary | ICD-10-CM

## 2018-06-08 NOTE — TELEPHONE ENCOUNTER
PT calls with c/o BLE edema- she states that it has been going for sometime- she tries to keep them elevated but that doesn't help much- they are better in the morning than in the afternoon/evenings- no SOA. She does not weigh herself daily but she goes to doctors appts pretty regularly-     She does take Ziac 5/6.25 mg daily but no other fluid medications- recent labs 12/2017:    Lab Results   Component Value Date    GLUCOSE 77 08/22/2016    BUN 15 12/08/2017    CREATININE 0.90 12/08/2017    EGFRIFNONA 60 (L) 12/08/2017    EGFRIFAFRI 73 12/08/2017    BCR 16.7 12/08/2017    K 3.8 12/08/2017    CO2 31.0 12/08/2017    CALCIUM 9.2 12/08/2017    PROTENTOTREF 7.4 12/08/2017    ALBUMIN 4.10 12/08/2017    LABIL2 1.2 (L) 12/08/2017    AST 22 12/08/2017    ALT 17 12/08/2017

## 2018-06-12 RX ORDER — TRIAMTERENE AND HYDROCHLOROTHIAZIDE 37.5; 25 MG/1; MG/1
1 TABLET ORAL DAILY
Qty: 90 TABLET | Refills: 1 | Status: SHIPPED | OUTPATIENT
Start: 2018-06-12 | End: 2018-07-11 | Stop reason: SDDI

## 2018-06-26 ENCOUNTER — CLINICAL SUPPORT (OUTPATIENT)
Dept: ORTHOPEDIC SURGERY | Facility: CLINIC | Age: 83
End: 2018-06-26

## 2018-06-26 DIAGNOSIS — M17.12 PRIMARY OSTEOARTHRITIS OF LEFT KNEE: Primary | ICD-10-CM

## 2018-06-26 PROCEDURE — 20610 DRAIN/INJ JOINT/BURSA W/O US: CPT | Performed by: PHYSICIAN ASSISTANT

## 2018-06-26 NOTE — PROGRESS NOTES
Subjective     No chief complaint on file.      Gaviota Evans is a 82 y.o. female.     History of Present Illness   Patient presents today for the first injection of Orthovisc in the left knee. She has never had previous visco  Allergies   Allergen Reactions   • Ambien [Zolpidem Tartrate]      amnestic   • Arthrotec [Diclofenac-Misoprostol] GI Intolerance   • Aspirin GI Intolerance   • Bextra [Valdecoxib] GI Intolerance   • Codeine GI Intolerance   • Crestor [Rosuvastatin]      Rxn not known   • Darvon [Propoxyphene] GI Intolerance   • Effient [Prasugrel] Other (See Comments)     Tongue swelling   • Lescol [Fluvastatin]      Rxn not known   • Lexapro [Escitalopram Oxalate] Nausea Only   • Macrobid [Nitrofurantoin] Other (See Comments)     Pain/cramping   • Percocet [Oxycodone-Acetaminophen]      depression   • Pravachol [Pravastatin Sodium]      Rxn not known   • Vioxx [Rofecoxib] GI Intolerance   • Welchol [Colesevelam Hcl]      Joint pain   • Statins    • Ciprofloxacin    • Lyrica [Pregabalin] GI Bleeding and GI Intolerance   • Plavix [Clopidogrel Bisulfate] Swelling     Tongue swelling   • Sulfamethoxazole-Trimethoprim Rash     Current Outpatient Prescriptions on File Prior to Visit   Medication Sig Dispense Refill   • aspirin 81 MG EC tablet Take 81 mg by mouth daily.     • bisoprolol-hydrochlorothiazide (ZIAC) 5-6.25 MG per tablet Take 1 tablet by mouth Daily. 90 tablet 3   • citalopram (CeleXA) 20 MG tablet Take 1 tablet by mouth Daily. 30 tablet 2   • Coenzyme Q10 (CO Q 10 PO) Take  by mouth Daily.     • cyanocobalamin 1000 MCG/ML injection Inject 1 mL into the shoulder, thigh, or buttocks Every 14 (Fourteen) Days. 6 mL 2   • diclofenac (VOLTAREN) 75 MG EC tablet Take 1 tablet by mouth 2 (Two) Times a Day As Needed (pain). Prn pain 60 tablet 0   • isosorbide mononitrate (IMDUR) 30 MG 24 hr tablet Take 1 tablet by mouth Daily. 90 tablet 3   • lidocaine (LIDODERM) 5 % Place 1 patch on the skin Daily. Remove &  Discard patch within 12 hours or as directed by MD 30 patch 5   • nitroglycerin (NITROSTAT) 0.4 MG SL tablet Place 1 tablet under the tongue As Needed for Chest Pain. Prn chest pain 25 tablet 11   • pantoprazole (PROTONIX) 40 MG EC tablet Take 1 tablet by mouth Daily. 90 tablet 0   • prednisoLONE acetate (PRED FORTE) 1 % ophthalmic suspension Administer 1 drop into the left eye 2 (two) times a week.     • simethicone (PHAZYME) 125 MG chewable tablet Chew 1 tablet Every 6 (Six) Hours As Needed for Flatulence. 60 tablet 1   • traMADol (ULTRAM) 50 MG tablet Take 1 tablet by mouth Every 6 (Six) Hours As Needed for Moderate Pain . 120 tablet 2   • triamterene-hydrochlorothiazide (MAXZIDE-25) 37.5-25 MG per tablet Take 1 tablet by mouth Daily. 90 tablet 1   • TURMERIC PO Take  by mouth Daily.     • vitamin E 400 UNIT capsule Take 400 Units by mouth Daily. 3 tablets daily       No current facility-administered medications on file prior to visit.      Social History     Social History   • Marital status:      Spouse name: N/A   • Number of children: N/A   • Years of education: N/A     Occupational History   • Not on file.     Social History Main Topics   • Smoking status: Never Smoker   • Smokeless tobacco: Never Used   • Alcohol use No   • Drug use: No   • Sexual activity: No     Other Topics Concern   • Not on file     Social History Narrative   • No narrative on file     Past Surgical History:   Procedure Laterality Date   • BLADDER REPAIR     • CHOLECYSTECTOMY      Status post cholecystectomy.   • COLONOSCOPY  01/2015   • CORONARY STENT PLACEMENT  05/2015    2 stents 80% RCA 12/2007, C 2/2011. no critical disease   • HYSTERECTOMY      partial   • LUMBAR FUSION  2006    L4-s1   • OTHER SURGICAL HISTORY      [facility]   • VAGINAL REPAIR       Family History   Problem Relation Age of Onset   • No Known Problems Sister    • Heart disease Sister    • Heart attack Sister 54   • Coronary artery disease Sister    •  Hypertension Mother      Past Medical History:   Diagnosis Date   • Atherosclerosis of abdominal aorta    • Atrophic vaginitis    • B12 deficiency    • Rivera's esophagus    • Cardiovascular disease    • Chronic back pain     Chronic back pain with intermittent epidural injections.   • Chronic low back pain    • Coronary artery disease    • Fibromyalgia    • Fibromyalgia    • GERD (gastroesophageal reflux disease)    • H/O mammogram 12/2014   • Hammer toe    • Hiatal hernia    • Hyperlipidemia    • Hypertension    • Lower extremity pain     Lower extremity discomfort: Right ROXANA 1.1, left ROXANA 1.1.   • Normocytic anemia due to blood loss    • Obstructive sleep apnea    • Obstructive sleep apnea treated with continuous positive airway pressure (CPAP)     Obstructive sleep apnea with CPAP compliance.   • Osteoarthritis     severe   • Palpitations     Event monitor, December 2011: Revealed brief episodes of atrial tachycardia.    • Senile osteoporosis          Review of Systems    The following portions of the patient's history were reviewed and updated as appropriate: allergies, current medications, past family history, past medical history, past social history, past surgical history and problem list.    Ortho Exam      Medical Decision Making    Assessment and Plan/ Diagnosis/Treatment options:   Left knee arthritis.  Plan is to begin the series with first injection of Orthovisc today.  She will return in 1 week for the second injection.  I reviewed with her that she should not expect any relief following this first injection.  Often times, it can take up to 4-6 weeks after the final injection to get optimal relief from the series.  I will see her back in 1 week or sooner if needed.    Using sterile technique, the left knee was sterilely prepped with Hibiclens.  Following time out, using a 22 gauge needle the left knee was aspirated and then injected with 2 ml Orthovisc. Approximately 0.5 mm of straw-colored fluid was  obtained.  Patient tolerated the procedure well.  No complications.

## 2018-06-26 NOTE — PROGRESS NOTES
Procedure   Large Joint Arthrocentesis  Date/Time: 6/26/2018 9:48 AM  Consent given by: patient  Site marked: site marked  Timeout: Immediately prior to procedure a time out was called to verify the correct patient, procedure, equipment, support staff and site/side marked as required   Supporting Documentation  Indications: pain   Procedure Details  Location: knee - L knee  Preparation: Patient was prepped and draped in the usual sterile fashion  Needle size: 22 G  Approach: superior  Medications administered: 30 mg Hyaluronan 30 MG/2ML  Aspirate: clear (to verify intraarticular placement of the needle)  Patient tolerance: patient tolerated the procedure well with no immediate complications

## 2018-07-03 ENCOUNTER — CLINICAL SUPPORT (OUTPATIENT)
Dept: ORTHOPEDIC SURGERY | Facility: CLINIC | Age: 83
End: 2018-07-03

## 2018-07-03 DIAGNOSIS — M17.12 PRIMARY OSTEOARTHRITIS OF LEFT KNEE: Primary | ICD-10-CM

## 2018-07-03 PROCEDURE — 20610 DRAIN/INJ JOINT/BURSA W/O US: CPT | Performed by: PHYSICIAN ASSISTANT

## 2018-07-03 NOTE — PROGRESS NOTES
Procedure   Large Joint Arthrocentesis  Date/Time: 7/3/2018 9:47 AM  Consent given by: patient  Site marked: site marked  Timeout: Immediately prior to procedure a time out was called to verify the correct patient, procedure, equipment, support staff and site/side marked as required   Supporting Documentation  Indications: pain   Procedure Details  Location: knee - L knee  Preparation: Patient was prepped and draped in the usual sterile fashion  Needle size: 22 G  Approach: anterolateral  Medications administered: 30 mg Hyaluronan 30 MG/2ML  Patient tolerance: patient tolerated the procedure well with no immediate complications

## 2018-07-03 NOTE — PROGRESS NOTES
Subjective     Follow-up (Left knee orthovisc injection )      Gaviota Evans is a 82 y.o. female.     History of Present Illness   Patient presents for second injection of Orthovisc in the left knee.  She is tolerated previous injections well.  No new symptoms.  Allergies   Allergen Reactions   • Ambien [Zolpidem Tartrate]      amnestic   • Arthrotec [Diclofenac-Misoprostol] GI Intolerance   • Aspirin GI Intolerance   • Bextra [Valdecoxib] GI Intolerance   • Codeine GI Intolerance   • Crestor [Rosuvastatin]      Rxn not known   • Darvon [Propoxyphene] GI Intolerance   • Effient [Prasugrel] Other (See Comments)     Tongue swelling   • Lescol [Fluvastatin]      Rxn not known   • Lexapro [Escitalopram Oxalate] Nausea Only   • Macrobid [Nitrofurantoin] Other (See Comments)     Pain/cramping   • Percocet [Oxycodone-Acetaminophen]      depression   • Pravachol [Pravastatin Sodium]      Rxn not known   • Vioxx [Rofecoxib] GI Intolerance   • Welchol [Colesevelam Hcl]      Joint pain   • Statins    • Ciprofloxacin    • Lyrica [Pregabalin] GI Bleeding and GI Intolerance   • Plavix [Clopidogrel Bisulfate] Swelling     Tongue swelling   • Sulfamethoxazole-Trimethoprim Rash     Current Outpatient Prescriptions on File Prior to Visit   Medication Sig Dispense Refill   • aspirin 81 MG EC tablet Take 81 mg by mouth daily.     • bisoprolol-hydrochlorothiazide (ZIAC) 5-6.25 MG per tablet Take 1 tablet by mouth Daily. 90 tablet 3   • citalopram (CeleXA) 20 MG tablet Take 1 tablet by mouth Daily. 30 tablet 2   • Coenzyme Q10 (CO Q 10 PO) Take  by mouth Daily.     • cyanocobalamin 1000 MCG/ML injection Inject 1 mL into the shoulder, thigh, or buttocks Every 14 (Fourteen) Days. 6 mL 2   • diclofenac (VOLTAREN) 75 MG EC tablet Take 1 tablet by mouth 2 (Two) Times a Day As Needed (pain). Prn pain 60 tablet 0   • isosorbide mononitrate (IMDUR) 30 MG 24 hr tablet Take 1 tablet by mouth Daily. 90 tablet 3   • lidocaine (LIDODERM) 5 % Place  1 patch on the skin Daily. Remove & Discard patch within 12 hours or as directed by MD 30 patch 5   • nitroglycerin (NITROSTAT) 0.4 MG SL tablet Place 1 tablet under the tongue As Needed for Chest Pain. Prn chest pain 25 tablet 11   • pantoprazole (PROTONIX) 40 MG EC tablet Take 1 tablet by mouth Daily. 90 tablet 0   • prednisoLONE acetate (PRED FORTE) 1 % ophthalmic suspension Administer 1 drop into the left eye 2 (two) times a week.     • simethicone (PHAZYME) 125 MG chewable tablet Chew 1 tablet Every 6 (Six) Hours As Needed for Flatulence. 60 tablet 1   • traMADol (ULTRAM) 50 MG tablet Take 1 tablet by mouth Every 6 (Six) Hours As Needed for Moderate Pain . 120 tablet 2   • triamterene-hydrochlorothiazide (MAXZIDE-25) 37.5-25 MG per tablet Take 1 tablet by mouth Daily. 90 tablet 1   • TURMERIC PO Take  by mouth Daily.     • vitamin E 400 UNIT capsule Take 400 Units by mouth Daily. 3 tablets daily       No current facility-administered medications on file prior to visit.      Social History     Social History   • Marital status:      Spouse name: N/A   • Number of children: N/A   • Years of education: N/A     Occupational History   • Not on file.     Social History Main Topics   • Smoking status: Never Smoker   • Smokeless tobacco: Never Used   • Alcohol use No   • Drug use: No   • Sexual activity: No     Other Topics Concern   • Not on file     Social History Narrative   • No narrative on file     Past Surgical History:   Procedure Laterality Date   • BLADDER REPAIR     • CHOLECYSTECTOMY      Status post cholecystectomy.   • COLONOSCOPY  01/2015   • CORONARY STENT PLACEMENT  05/2015    2 stents 80% RCA 12/2007, [facility] 2/2011. no critical disease   • HYSTERECTOMY      partial   • LUMBAR FUSION  2006    L4-s1   • OTHER SURGICAL HISTORY      [facility]   • VAGINAL REPAIR       Family History   Problem Relation Age of Onset   • No Known Problems Sister    • Heart disease Sister    • Heart attack Sister 54   • Coronary  artery disease Sister    • Hypertension Mother      Past Medical History:   Diagnosis Date   • Atherosclerosis of abdominal aorta (CMS/HCC)    • Atrophic vaginitis    • B12 deficiency    • Rivera's esophagus    • Cardiovascular disease    • Chronic back pain     Chronic back pain with intermittent epidural injections.   • Chronic low back pain    • Coronary artery disease    • Fibromyalgia    • Fibromyalgia    • GERD (gastroesophageal reflux disease)    • H/O mammogram 12/2014   • Hammer toe    • Hiatal hernia    • Hyperlipidemia    • Hypertension    • Lower extremity pain     Lower extremity discomfort: Right ROXANA 1.1, left ROXANA 1.1.   • Normocytic anemia due to blood loss    • Obstructive sleep apnea    • Obstructive sleep apnea treated with continuous positive airway pressure (CPAP)     Obstructive sleep apnea with CPAP compliance.   • Osteoarthritis     severe   • Palpitations     Event monitor, December 2011: Revealed brief episodes of atrial tachycardia.    • Senile osteoporosis          Review of Systems   Constitutional: Negative.    HENT: Negative.    Eyes: Negative.    Respiratory: Negative.    Cardiovascular: Negative.    Gastrointestinal: Negative.    Endocrine: Negative.    Genitourinary: Negative.    Musculoskeletal: Positive for arthralgias.   Skin: Negative.    Allergic/Immunologic: Negative.    Neurological: Negative.    Hematological: Negative.    Psychiatric/Behavioral: Negative.        The following portions of the patient's history were reviewed and updated as appropriate: allergies, current medications, past family history, past medical history, past social history, past surgical history and problem list.    Ortho Exam      Medical Decision Making    Assessment and Plan/ Diagnosis/Treatment options:   Left knee arthritis undergoing an Orthovisc series.  Plan is to proceed with a second injection left knee today.  She'll return in 1 week for the final injection or sooner if needed.    Using  sterile technique, the left knee was sterilely prepped with Hibiclens.  Following time out, using a 22 gauge needle the left knee was aspirated and then injected with 2 ml Orthovisc. Approximately 0.5 mm of straw-colored fluid was obtained.  Patient tolerated the procedure well.  No complications.

## 2018-07-10 ENCOUNTER — CLINICAL SUPPORT (OUTPATIENT)
Dept: ORTHOPEDIC SURGERY | Facility: CLINIC | Age: 83
End: 2018-07-10

## 2018-07-10 DIAGNOSIS — M17.12 PRIMARY OSTEOARTHRITIS OF LEFT KNEE: Primary | ICD-10-CM

## 2018-07-10 PROCEDURE — 20610 DRAIN/INJ JOINT/BURSA W/O US: CPT | Performed by: PHYSICIAN ASSISTANT

## 2018-07-10 NOTE — PROGRESS NOTES
Subjective     Follow-up of the Left Knee      Gaviota Evans is a 82 y.o. female.     History of Present Illness   Patient presents for the final injection of Orthovisc in the left knee.  She is tolerated previous injections well.  She reports improved pain since beginning the series.  Allergies   Allergen Reactions   • Ambien [Zolpidem Tartrate]      amnestic   • Arthrotec [Diclofenac-Misoprostol] GI Intolerance   • Aspirin GI Intolerance   • Bextra [Valdecoxib] GI Intolerance   • Codeine GI Intolerance   • Crestor [Rosuvastatin]      Rxn not known   • Darvon [Propoxyphene] GI Intolerance   • Effient [Prasugrel] Other (See Comments)     Tongue swelling   • Lescol [Fluvastatin]      Rxn not known   • Lexapro [Escitalopram Oxalate] Nausea Only   • Macrobid [Nitrofurantoin] Other (See Comments)     Pain/cramping   • Percocet [Oxycodone-Acetaminophen]      depression   • Pravachol [Pravastatin Sodium]      Rxn not known   • Vioxx [Rofecoxib] GI Intolerance   • Welchol [Colesevelam Hcl]      Joint pain   • Statins    • Ciprofloxacin    • Lyrica [Pregabalin] GI Bleeding and GI Intolerance   • Plavix [Clopidogrel Bisulfate] Swelling     Tongue swelling   • Sulfamethoxazole-Trimethoprim Rash     Current Outpatient Prescriptions on File Prior to Visit   Medication Sig Dispense Refill   • aspirin 81 MG EC tablet Take 81 mg by mouth daily.     • bisoprolol-hydrochlorothiazide (ZIAC) 5-6.25 MG per tablet Take 1 tablet by mouth Daily. 90 tablet 3   • citalopram (CeleXA) 20 MG tablet Take 1 tablet by mouth Daily. 30 tablet 2   • Coenzyme Q10 (CO Q 10 PO) Take  by mouth Daily.     • cyanocobalamin 1000 MCG/ML injection Inject 1 mL into the shoulder, thigh, or buttocks Every 14 (Fourteen) Days. 6 mL 2   • diclofenac (VOLTAREN) 75 MG EC tablet Take 1 tablet by mouth 2 (Two) Times a Day As Needed (pain). Prn pain 60 tablet 0   • isosorbide mononitrate (IMDUR) 30 MG 24 hr tablet Take 1 tablet by mouth Daily. 90 tablet 3   •  lidocaine (LIDODERM) 5 % Place 1 patch on the skin Daily. Remove & Discard patch within 12 hours or as directed by MD 30 patch 5   • nitroglycerin (NITROSTAT) 0.4 MG SL tablet Place 1 tablet under the tongue As Needed for Chest Pain. Prn chest pain 25 tablet 11   • pantoprazole (PROTONIX) 40 MG EC tablet Take 1 tablet by mouth Daily. 90 tablet 0   • prednisoLONE acetate (PRED FORTE) 1 % ophthalmic suspension Administer 1 drop into the left eye 2 (two) times a week.     • simethicone (PHAZYME) 125 MG chewable tablet Chew 1 tablet Every 6 (Six) Hours As Needed for Flatulence. 60 tablet 1   • traMADol (ULTRAM) 50 MG tablet Take 1 tablet by mouth Every 6 (Six) Hours As Needed for Moderate Pain . 120 tablet 2   • triamterene-hydrochlorothiazide (MAXZIDE-25) 37.5-25 MG per tablet Take 1 tablet by mouth Daily. 90 tablet 1   • TURMERIC PO Take  by mouth Daily.     • vitamin E 400 UNIT capsule Take 400 Units by mouth Daily. 3 tablets daily       No current facility-administered medications on file prior to visit.      Social History     Social History   • Marital status:      Spouse name: N/A   • Number of children: N/A   • Years of education: N/A     Occupational History   • Not on file.     Social History Main Topics   • Smoking status: Never Smoker   • Smokeless tobacco: Never Used   • Alcohol use No   • Drug use: No   • Sexual activity: No     Other Topics Concern   • Not on file     Social History Narrative   • No narrative on file     Past Surgical History:   Procedure Laterality Date   • BLADDER REPAIR     • CHOLECYSTECTOMY      Status post cholecystectomy.   • COLONOSCOPY  01/2015   • CORONARY STENT PLACEMENT  05/2015    2 stents 80% RCA 12/2007, C 2/2011. no critical disease   • HYSTERECTOMY      partial   • LUMBAR FUSION  2006    L4-s1   • OTHER SURGICAL HISTORY      [facility]   • VAGINAL REPAIR       Family History   Problem Relation Age of Onset   • No Known Problems Sister    • Heart disease Sister    • Heart  attack Sister 54   • Coronary artery disease Sister    • Hypertension Mother      Past Medical History:   Diagnosis Date   • Atherosclerosis of abdominal aorta (CMS/HCC)    • Atrophic vaginitis    • B12 deficiency    • Rivera's esophagus    • Cardiovascular disease    • Chronic back pain     Chronic back pain with intermittent epidural injections.   • Chronic low back pain    • Coronary artery disease    • Fibromyalgia    • Fibromyalgia    • GERD (gastroesophageal reflux disease)    • H/O mammogram 12/2014   • Hammer toe    • Hiatal hernia    • Hyperlipidemia    • Hypertension    • Lower extremity pain     Lower extremity discomfort: Right ROXANA 1.1, left ROXANA 1.1.   • Normocytic anemia due to blood loss    • Obstructive sleep apnea    • Obstructive sleep apnea treated with continuous positive airway pressure (CPAP)     Obstructive sleep apnea with CPAP compliance.   • Osteoarthritis     severe   • Palpitations     Event monitor, December 2011: Revealed brief episodes of atrial tachycardia.    • Senile osteoporosis          Review of Systems   Constitutional: Negative.    HENT: Negative.    Eyes: Negative.    Respiratory: Negative.    Cardiovascular: Negative.    Gastrointestinal: Negative.    Endocrine: Negative.    Genitourinary: Negative.    Musculoskeletal: Positive for arthralgias.   Skin: Negative.    Allergic/Immunologic: Negative.    Neurological: Negative.    Hematological: Negative.    Psychiatric/Behavioral: Negative.        The following portions of the patient's history were reviewed and updated as appropriate: allergies, current medications, past family history, past medical history, past social history, past surgical history and problem list.    Ortho Exam      Medical Decision Making    Assessment and Plan/ Diagnosis/Treatment options  Left knee arthritis undergoing an Orthovisc series.  Plan is to finish the series in the left knee today.  She'll return in 6-8 weeks to see how she has progressed or  sooner if needed.      Using sterile technique, the left knee was sterilely prepped with Hibiclens.  Following time out, using a 22 gauge needle the left knee was aspirated and then injected with 2 ml Orthovisc. Approximately 0.5 mm of straw-colored fluid was obtained.  Patient tolerated the procedure well.  No complications.

## 2018-07-10 NOTE — PROGRESS NOTES
Procedure   Large Joint Arthrocentesis  Date/Time: 7/10/2018 10:10 AM  Consent given by: patient  Site marked: site marked  Timeout: Immediately prior to procedure a time out was called to verify the correct patient, procedure, equipment, support staff and site/side marked as required   Supporting Documentation  Indications: pain   Procedure Details  Location: knee - L knee  Preparation: Patient was prepped and draped in the usual sterile fashion  Needle size: 22 G  Approach: anterolateral  Medications administered: 30 mg Hyaluronan 30 MG/2ML  Patient tolerance: patient tolerated the procedure well with no immediate complications

## 2018-07-11 ENCOUNTER — OFFICE VISIT (OUTPATIENT)
Dept: INTERNAL MEDICINE | Facility: CLINIC | Age: 83
End: 2018-07-11

## 2018-07-11 VITALS
HEART RATE: 104 BPM | BODY MASS INDEX: 26.5 KG/M2 | HEIGHT: 62 IN | OXYGEN SATURATION: 94 % | TEMPERATURE: 98.5 F | SYSTOLIC BLOOD PRESSURE: 132 MMHG | WEIGHT: 144 LBS | DIASTOLIC BLOOD PRESSURE: 72 MMHG

## 2018-07-11 DIAGNOSIS — M25.571 CHRONIC PAIN OF RIGHT ANKLE: ICD-10-CM

## 2018-07-11 DIAGNOSIS — R39.15 URGENCY OF URINATION: Primary | ICD-10-CM

## 2018-07-11 DIAGNOSIS — G89.29 CHRONIC PAIN OF RIGHT ANKLE: ICD-10-CM

## 2018-07-11 DIAGNOSIS — G25.81 RESTLESS LEG: ICD-10-CM

## 2018-07-11 LAB
BILIRUB BLD-MCNC: NEGATIVE MG/DL
CLARITY, POC: CLEAR
COLOR UR: YELLOW
GLUCOSE UR STRIP-MCNC: NEGATIVE MG/DL
KETONES UR QL: NEGATIVE
LEUKOCYTE EST, POC: NEGATIVE
NITRITE UR-MCNC: NEGATIVE MG/ML
PH UR: 6 [PH] (ref 5–8)
PROT UR STRIP-MCNC: NEGATIVE MG/DL
RBC # UR STRIP: NEGATIVE /UL
SP GR UR: 1.01 (ref 1–1.03)
UROBILINOGEN UR QL: NORMAL

## 2018-07-11 PROCEDURE — 99214 OFFICE O/P EST MOD 30 MIN: CPT | Performed by: FAMILY MEDICINE

## 2018-07-11 PROCEDURE — 81003 URINALYSIS AUTO W/O SCOPE: CPT | Performed by: FAMILY MEDICINE

## 2018-07-11 RX ORDER — CITALOPRAM 20 MG/1
20 TABLET ORAL DAILY
Qty: 30 TABLET | Refills: 2 | Status: SHIPPED | OUTPATIENT
Start: 2018-07-11 | End: 2018-10-23 | Stop reason: SDUPTHER

## 2018-07-11 RX ORDER — ROPINIROLE 0.25 MG/1
0.25 TABLET, FILM COATED ORAL NIGHTLY
Qty: 30 TABLET | Refills: 2 | Status: SHIPPED | OUTPATIENT
Start: 2018-07-11 | End: 2019-01-07 | Stop reason: SDUPTHER

## 2018-07-11 NOTE — PROGRESS NOTES
"Subjective   Gaviota Evans is a 82 y.o. female.     Chief Complaint   Patient presents with   • 3 month follow up   • Anxiety   • Med Refill       Visit Vitals  /72   Pulse 104   Temp 98.5 °F (36.9 °C) (Temporal Artery )   Ht 157.5 cm (62.01\")   Wt 65.3 kg (144 lb)   LMP  (LMP Unknown)   SpO2 94%   BMI 26.33 kg/m²         Anxiety   Presents for follow-up visit. Symptoms include nervous/anxious behavior. Patient reports no chest pain, compulsions, confusion, decreased concentration, depressed mood, dizziness, dry mouth, excessive worry, feeling of choking, hyperventilation, insomnia, irritability, malaise, muscle tension, nausea, obsessions, palpitations, panic, restlessness, shortness of breath or suicidal ideas. Symptoms occur occasionally. The severity of symptoms is mild. The quality of sleep is fair. Nighttime awakenings: several.     Compliance with medications is %.      Pt had her 3rd knee injection and it is feeling better.  Pt was advised by Ortho to see ankle specialist for right ankle pain.  Pt having jerks in her legs at night.     The following portions of the patient's history were reviewed and updated as appropriate: allergies, current medications, past family history, past medical history, past social history, past surgical history and problem list.    Past Medical History:   Diagnosis Date   • Atherosclerosis of abdominal aorta (CMS/HCC)    • Atrophic vaginitis    • B12 deficiency    • Rivera's esophagus    • Cardiovascular disease    • Chronic back pain     Chronic back pain with intermittent epidural injections.   • Chronic low back pain    • Coronary artery disease    • Fibromyalgia    • Fibromyalgia    • GERD (gastroesophageal reflux disease)    • H/O mammogram 12/2014   • Hammer toe    • Hiatal hernia    • Hyperlipidemia    • Hypertension    • Lower extremity pain     Lower extremity discomfort: Right ROXANA 1.1, left ROXANA 1.1.   • Normocytic anemia due to blood loss    • Obstructive " sleep apnea    • Obstructive sleep apnea treated with continuous positive airway pressure (CPAP)     Obstructive sleep apnea with CPAP compliance.   • Osteoarthritis     severe   • Palpitations     Event monitor, December 2011: Revealed brief episodes of atrial tachycardia.    • Senile osteoporosis       Past Surgical History:   Procedure Laterality Date   • BLADDER REPAIR     • CHOLECYSTECTOMY      Status post cholecystectomy.   • COLONOSCOPY  01/2015   • CORONARY STENT PLACEMENT  05/2015    2 stents 80% RCA 12/2007, LHC 2/2011. no critical disease   • HYSTERECTOMY      partial   • LUMBAR FUSION  2006    L4-s1   • OTHER SURGICAL HISTORY      [facility]   • VAGINAL REPAIR        Family History   Problem Relation Age of Onset   • No Known Problems Sister    • Heart disease Sister    • Heart attack Sister 54   • Coronary artery disease Sister    • Hypertension Mother       Social History     Social History   • Marital status:      Spouse name: N/A   • Number of children: N/A   • Years of education: N/A     Occupational History   • Not on file.     Social History Main Topics   • Smoking status: Never Smoker   • Smokeless tobacco: Never Used   • Alcohol use No   • Drug use: No   • Sexual activity: No     Other Topics Concern   • Not on file     Social History Narrative   • No narrative on file        Review of Systems   Constitutional: Negative.  Negative for chills, diaphoresis, fatigue, fever and irritability.   HENT: Negative.  Negative for ear pain, nosebleeds, postnasal drip, rhinorrhea, sinus pressure, sneezing and sore throat.    Eyes: Negative.  Negative for redness and itching.   Respiratory: Negative.  Negative for cough, shortness of breath and wheezing.    Cardiovascular: Negative.  Negative for chest pain and palpitations.   Gastrointestinal: Negative.  Negative for abdominal pain, constipation, diarrhea, nausea and vomiting.   Endocrine: Negative.  Negative for cold intolerance and heat intolerance.    Genitourinary: Positive for urgency. Negative for dysuria, frequency and hematuria.   Musculoskeletal: Negative.  Negative for arthralgias, back pain and neck pain.   Skin: Negative.  Negative for color change and rash.   Allergic/Immunologic: Negative.  Negative for environmental allergies.   Neurological: Negative.  Negative for dizziness, syncope, light-headedness and headaches.   Hematological: Negative.  Negative for adenopathy. Does not bruise/bleed easily.   Psychiatric/Behavioral: Positive for sleep disturbance. Negative for confusion, decreased concentration, dysphoric mood and suicidal ideas. The patient is nervous/anxious. The patient does not have insomnia.        Objective   Physical Exam   Constitutional: She is oriented to person, place, and time. She appears well-developed.   HENT:   Head: Normocephalic.   Right Ear: External ear normal.   Left Ear: External ear normal.   Nose: Nose normal.   Eyes: Conjunctivae, EOM and lids are normal. Pupils are equal, round, and reactive to light.   Neck: Trachea normal and normal range of motion. Neck supple. Carotid bruit is not present. No thyroid mass and no thyromegaly present.   Cardiovascular: Normal rate and regular rhythm.    No murmur heard.  Pulmonary/Chest: Effort normal and breath sounds normal. No respiratory distress. She has no decreased breath sounds. She has no wheezes. She has no rhonchi. She has no rales. She exhibits no tenderness.   Abdominal: Soft. Bowel sounds are normal. There is no tenderness.   Musculoskeletal: Normal range of motion.        Right ankle: She exhibits swelling. She exhibits normal range of motion. Tenderness. Lateral malleolus tenderness found.   Neurological: She is alert and oriented to person, place, and time.   Skin: Skin is warm and dry.   Psychiatric: She has a normal mood and affect. Her behavior is normal.   Nursing note and vitals reviewed.      Assessment/Plan   Gaviota was seen today for 3 month follow up,  anxiety and med refill.    Diagnoses and all orders for this visit:    Urgency of urination  -     POCT urinalysis dipstick, automated    Restless leg  -     rOPINIRole (REQUIP) 0.25 MG tablet; Take 1 tablet by mouth Every Night. Take 1 hour before bedtime.    Chronic pain of right ankle  -     Ambulatory Referral to Orthopedic Surgery  -     XR Ankle 3+ View Right; Future    Other orders  -     citalopram (CeleXA) 20 MG tablet; Take 1 tablet by mouth Daily.                   Current Outpatient Prescriptions:   •  aspirin 81 MG EC tablet, Take 81 mg by mouth daily., Disp: , Rfl:   •  bisoprolol-hydrochlorothiazide (ZIAC) 5-6.25 MG per tablet, Take 1 tablet by mouth Daily., Disp: 90 tablet, Rfl: 3  •  citalopram (CeleXA) 20 MG tablet, Take 1 tablet by mouth Daily., Disp: 30 tablet, Rfl: 2  •  Coenzyme Q10 (CO Q 10 PO), Take  by mouth Daily., Disp: , Rfl:   •  cyanocobalamin 1000 MCG/ML injection, Inject 1 mL into the shoulder, thigh, or buttocks Every 14 (Fourteen) Days., Disp: 6 mL, Rfl: 2  •  diclofenac (VOLTAREN) 75 MG EC tablet, Take 1 tablet by mouth 2 (Two) Times a Day As Needed (pain). Prn pain, Disp: 60 tablet, Rfl: 0  •  isosorbide mononitrate (IMDUR) 30 MG 24 hr tablet, Take 1 tablet by mouth Daily., Disp: 90 tablet, Rfl: 3  •  lidocaine (LIDODERM) 5 %, Place 1 patch on the skin Daily. Remove & Discard patch within 12 hours or as directed by MD, Disp: 30 patch, Rfl: 5  •  nitroglycerin (NITROSTAT) 0.4 MG SL tablet, Place 1 tablet under the tongue As Needed for Chest Pain. Prn chest pain, Disp: 25 tablet, Rfl: 11  •  pantoprazole (PROTONIX) 40 MG EC tablet, Take 1 tablet by mouth Daily., Disp: 90 tablet, Rfl: 0  •  prednisoLONE acetate (PRED FORTE) 1 % ophthalmic suspension, Administer 1 drop into the left eye 2 (two) times a week., Disp: , Rfl:   •  simethicone (PHAZYME) 125 MG chewable tablet, Chew 1 tablet Every 6 (Six) Hours As Needed for Flatulence., Disp: 60 tablet, Rfl: 1  •  traMADol (ULTRAM) 50 MG  tablet, Take 1 tablet by mouth Every 6 (Six) Hours As Needed for Moderate Pain ., Disp: 120 tablet, Rfl: 2  •  TURMERIC PO, Take  by mouth Daily., Disp: , Rfl:   •  vitamin E 400 UNIT capsule, Take 400 Units by mouth Daily. 3 tablets daily, Disp: , Rfl:   •  rOPINIRole (REQUIP) 0.25 MG tablet, Take 1 tablet by mouth Every Night. Take 1 hour before bedtime., Disp: 30 tablet, Rfl: 2    Return in about 3 months (around 10/11/2018), or if symptoms worsen or fail to improve, for Recheck.    Recent Results (from the past 168 hour(s))   POCT urinalysis dipstick, automated    Collection Time: 07/11/18  3:49 PM   Result Value Ref Range    Color Yellow Yellow, Straw, Dark Yellow, Glo    Clarity, UA Clear Clear    Specific Gravity  1.010 1.005 - 1.030    pH, Urine 6.0 5.0 - 8.0    Leukocytes Negative Negative    Nitrite, UA Negative Negative    Protein, POC Negative Negative mg/dL    Glucose, UA Negative Negative, 1000 mg/dL (3+) mg/dL    Ketones, UA Negative Negative    Urobilinogen, UA Normal Normal    Bilirubin Negative Negative    Blood, UA Negative Negative

## 2018-08-10 DIAGNOSIS — G89.29 CHRONIC MIDLINE LOW BACK PAIN WITH BILATERAL SCIATICA: ICD-10-CM

## 2018-08-10 DIAGNOSIS — M54.42 CHRONIC MIDLINE LOW BACK PAIN WITH BILATERAL SCIATICA: ICD-10-CM

## 2018-08-10 DIAGNOSIS — M54.41 CHRONIC MIDLINE LOW BACK PAIN WITH BILATERAL SCIATICA: ICD-10-CM

## 2018-08-13 ENCOUNTER — TELEPHONE (OUTPATIENT)
Dept: INTERNAL MEDICINE | Facility: CLINIC | Age: 83
End: 2018-08-13

## 2018-08-13 DIAGNOSIS — G89.29 CHRONIC MIDLINE LOW BACK PAIN WITH BILATERAL SCIATICA: ICD-10-CM

## 2018-08-13 DIAGNOSIS — M54.42 CHRONIC MIDLINE LOW BACK PAIN WITH BILATERAL SCIATICA: ICD-10-CM

## 2018-08-13 DIAGNOSIS — M54.41 CHRONIC MIDLINE LOW BACK PAIN WITH BILATERAL SCIATICA: ICD-10-CM

## 2018-08-13 RX ORDER — TRAMADOL HYDROCHLORIDE 50 MG/1
TABLET ORAL
Qty: 120 TABLET | Refills: 1 | OUTPATIENT
Start: 2018-08-13

## 2018-08-13 RX ORDER — TRAMADOL HYDROCHLORIDE 50 MG/1
50 TABLET ORAL EVERY 6 HOURS PRN
Qty: 120 TABLET | Refills: 1 | Status: SHIPPED | OUTPATIENT
Start: 2018-08-13 | End: 2018-10-24 | Stop reason: SDUPTHER

## 2018-08-28 ENCOUNTER — OFFICE VISIT (OUTPATIENT)
Dept: ORTHOPEDIC SURGERY | Facility: CLINIC | Age: 83
End: 2018-08-28

## 2018-08-28 VITALS — OXYGEN SATURATION: 96 % | HEIGHT: 62 IN | WEIGHT: 152 LBS | BODY MASS INDEX: 27.97 KG/M2 | HEART RATE: 75 BPM

## 2018-08-28 DIAGNOSIS — M17.12 PRIMARY OSTEOARTHRITIS OF LEFT KNEE: Primary | ICD-10-CM

## 2018-08-28 PROCEDURE — 99212 OFFICE O/P EST SF 10 MIN: CPT | Performed by: PHYSICIAN ASSISTANT

## 2018-08-28 NOTE — PROGRESS NOTES
[facility]    Subjective     Patient: Gaviota Evans  : 1935    Primary Care Provider: Angela Sorto MD    Requesting Provider: As above    Follow-up (7 week f/u Left knee pain, (had orthovisc series))      History    Chief Complaint: Follow-up left knee arthritis    History of Present Illness: Patient returns today for follow-up of her left knee arthritis 6 weeks after finishing an Orthovisc series.  She reports that the series has improved her pain quite a bit.  She continues to use a cane.  She continues to have pain with standing and clicking but it is better.    Current Outpatient Prescriptions on File Prior to Visit   Medication Sig Dispense Refill   • aspirin 81 MG EC tablet Take 81 mg by mouth daily.     • bisoprolol-hydrochlorothiazide (ZIAC) 5-6.25 MG per tablet Take 1 tablet by mouth Daily. 90 tablet 3   • citalopram (CeleXA) 20 MG tablet Take 1 tablet by mouth Daily. 30 tablet 2   • Coenzyme Q10 (CO Q 10 PO) Take  by mouth Daily.     • cyanocobalamin 1000 MCG/ML injection Inject 1 mL into the shoulder, thigh, or buttocks Every 14 (Fourteen) Days. 6 mL 2   • diclofenac (VOLTAREN) 75 MG EC tablet Take 1 tablet by mouth 2 (Two) Times a Day As Needed (pain). Prn pain 60 tablet 0   • isosorbide mononitrate (IMDUR) 30 MG 24 hr tablet Take 1 tablet by mouth Daily. 90 tablet 3   • lidocaine (LIDODERM) 5 % Place 1 patch on the skin Daily. Remove & Discard patch within 12 hours or as directed by MD 30 patch 5   • nitroglycerin (NITROSTAT) 0.4 MG SL tablet Place 1 tablet under the tongue As Needed for Chest Pain. Prn chest pain 25 tablet 11   • pantoprazole (PROTONIX) 40 MG EC tablet Take 1 tablet by mouth Daily. 90 tablet 0   • prednisoLONE acetate (PRED FORTE) 1 % ophthalmic suspension Administer 1 drop into the left eye 2 (two) times a week.     • rOPINIRole (REQUIP) 0.25 MG tablet Take 1 tablet by mouth Every Night. Take 1 hour before bedtime. 30 tablet 2   •  simethicone (PHAZYME) 125 MG chewable tablet Chew 1 tablet Every 6 (Six) Hours As Needed for Flatulence. 60 tablet 1   • traMADol (ULTRAM) 50 MG tablet Take 1 tablet by mouth Every 6 (Six) Hours As Needed for Moderate Pain . 120 tablet 1   • TURMERIC PO Take  by mouth Daily.     • vitamin E 400 UNIT capsule Take 400 Units by mouth Daily. 3 tablets daily       No current facility-administered medications on file prior to visit.       Allergies   Allergen Reactions   • Ambien [Zolpidem Tartrate]      amnestic   • Arthrotec [Diclofenac-Misoprostol] GI Intolerance   • Aspirin GI Intolerance   • Bextra [Valdecoxib] GI Intolerance   • Codeine GI Intolerance   • Crestor [Rosuvastatin]      Rxn not known   • Darvon [Propoxyphene] GI Intolerance   • Effient [Prasugrel] Other (See Comments)     Tongue swelling   • Lescol [Fluvastatin]      Rxn not known   • Lexapro [Escitalopram Oxalate] Nausea Only   • Macrobid [Nitrofurantoin] Other (See Comments)     Pain/cramping   • Percocet [Oxycodone-Acetaminophen]      depression   • Pravachol [Pravastatin Sodium]      Rxn not known   • Vioxx [Rofecoxib] GI Intolerance   • Welchol [Colesevelam Hcl]      Joint pain   • Statins    • Ciprofloxacin    • Lyrica [Pregabalin] GI Bleeding and GI Intolerance   • Plavix [Clopidogrel Bisulfate] Swelling     Tongue swelling   • Sulfamethoxazole-Trimethoprim Rash      Past Medical History:   Diagnosis Date   • Atherosclerosis of abdominal aorta (CMS/HCC)    • Atrophic vaginitis    • B12 deficiency    • Rivera's esophagus    • Cardiovascular disease    • Chronic back pain     Chronic back pain with intermittent epidural injections.   • Chronic low back pain    • Coronary artery disease    • Fibromyalgia    • Fibromyalgia    • GERD (gastroesophageal reflux disease)    • H/O mammogram 12/2014   • Hammer toe    • Hiatal hernia    • Hyperlipidemia    • Hypertension    • Lower extremity pain     Lower extremity discomfort: Right ROXANA 1.1, left ROXANA 1.1.    • Normocytic anemia due to blood loss    • Obstructive sleep apnea    • Obstructive sleep apnea treated with continuous positive airway pressure (CPAP)     Obstructive sleep apnea with CPAP compliance.   • Osteoarthritis     severe   • Palpitations     Event monitor, December 2011: Revealed brief episodes of atrial tachycardia.    • Senile osteoporosis      Past Surgical History:   Procedure Laterality Date   • BLADDER REPAIR     • CHOLECYSTECTOMY      Status post cholecystectomy.   • COLONOSCOPY  01/2015   • CORONARY STENT PLACEMENT  05/2015    2 stents 80% RCA 12/2007, LHC 2/2011. no critical disease   • HYSTERECTOMY      partial   • LUMBAR FUSION  2006    L4-s1   • OTHER SURGICAL HISTORY      [facility]   • VAGINAL REPAIR       Family History   Problem Relation Age of Onset   • No Known Problems Sister    • Heart disease Sister    • Heart attack Sister 54   • Coronary artery disease Sister    • Hypertension Mother       Social History     Social History   • Marital status:      Spouse name: N/A   • Number of children: N/A   • Years of education: N/A     Occupational History   • Not on file.     Social History Main Topics   • Smoking status: Never Smoker   • Smokeless tobacco: Never Used   • Alcohol use No   • Drug use: No   • Sexual activity: No     Other Topics Concern   • Not on file     Social History Narrative   • No narrative on file        Review of Systems   Constitutional: Negative.    HENT: Negative.    Eyes: Negative.    Respiratory: Negative.    Cardiovascular: Negative.    Gastrointestinal: Negative.    Endocrine: Negative.    Genitourinary: Negative.    Musculoskeletal: Positive for arthralgias (knee pain).   Skin: Negative.    Allergic/Immunologic: Negative.    Neurological: Negative.    Hematological: Negative.    Psychiatric/Behavioral: Negative.        The following portions of the patient's history were reviewed and updated as appropriate: allergies, current medications, past family history,  "past medical history, past social history, past surgical history and problem list.      Objective      Physical Exam  Pulse 75   Ht 157.5 cm (62.01\")   Wt 68.9 kg (152 lb)   LMP  (LMP Unknown)   SpO2 96%   BMI 27.79 kg/m²     Body mass index is 27.79 kg/m².    Patient is well nourished and well developed.      Ortho Exam  Left knee exam:  Tender over the medial joint line  ROM 0-120  LIgmaents stable  NVI  Using a cane for ambulation    Medical Decision Making    Data Review:   none    Assessment:  1. Primary osteoarthritis of left knee        Plan:  Doing well with nonoperative treatment of left knee arthritis.  I reviewed clinical findings, past and current treatment the patient.  She continues to use a cane for ambulation.  She reports the Orthovisc improved her pain quite a bit.  We discussed further treatment including repeat steroid injection, however, she had reaction with rash last time she had one and she would like to avoid it.  She also thought steroid injection made her feel more depressed.  Plan today is that she continue with her cane use, activity modification etc. and she'll return in January 2019 to consider repeat Orthovisc or sooner if needed.      Marylou Sheehan PA-C  08/28/18  12:00 PM    "

## 2018-09-17 RX ORDER — DICLOFENAC SODIUM 75 MG/1
TABLET, DELAYED RELEASE ORAL
Qty: 60 TABLET | Refills: 0 | Status: SHIPPED | OUTPATIENT
Start: 2018-09-17 | End: 2019-08-14

## 2018-10-19 ENCOUNTER — TELEPHONE (OUTPATIENT)
Dept: INTERNAL MEDICINE | Facility: CLINIC | Age: 83
End: 2018-10-19

## 2018-10-23 RX ORDER — CITALOPRAM 20 MG/1
20 TABLET ORAL DAILY
Qty: 30 TABLET | Refills: 0 | Status: SHIPPED | OUTPATIENT
Start: 2018-10-23 | End: 2018-10-24 | Stop reason: SDUPTHER

## 2018-10-24 ENCOUNTER — OFFICE VISIT (OUTPATIENT)
Dept: INTERNAL MEDICINE | Facility: CLINIC | Age: 83
End: 2018-10-24

## 2018-10-24 VITALS
DIASTOLIC BLOOD PRESSURE: 70 MMHG | OXYGEN SATURATION: 96 % | SYSTOLIC BLOOD PRESSURE: 128 MMHG | HEART RATE: 75 BPM | BODY MASS INDEX: 27.71 KG/M2 | TEMPERATURE: 97.2 F | WEIGHT: 150.6 LBS | HEIGHT: 62 IN

## 2018-10-24 DIAGNOSIS — K22.70 BARRETT'S ESOPHAGUS WITHOUT DYSPLASIA: ICD-10-CM

## 2018-10-24 DIAGNOSIS — E78.5 HYPERLIPIDEMIA, UNSPECIFIED HYPERLIPIDEMIA TYPE: ICD-10-CM

## 2018-10-24 DIAGNOSIS — F41.8 ANXIETY WITH DEPRESSION: ICD-10-CM

## 2018-10-24 DIAGNOSIS — I25.10 ASCVD (ARTERIOSCLEROTIC CARDIOVASCULAR DISEASE): ICD-10-CM

## 2018-10-24 DIAGNOSIS — M25.561 CHRONIC PAIN OF BOTH KNEES: Primary | ICD-10-CM

## 2018-10-24 DIAGNOSIS — M25.562 CHRONIC PAIN OF BOTH KNEES: Primary | ICD-10-CM

## 2018-10-24 DIAGNOSIS — R73.09 ABNORMAL GLUCOSE: ICD-10-CM

## 2018-10-24 DIAGNOSIS — G89.29 CHRONIC PAIN OF BOTH KNEES: Primary | ICD-10-CM

## 2018-10-24 DIAGNOSIS — M54.41 CHRONIC MIDLINE LOW BACK PAIN WITH BILATERAL SCIATICA: ICD-10-CM

## 2018-10-24 DIAGNOSIS — K21.00 GASTROESOPHAGEAL REFLUX DISEASE WITH ESOPHAGITIS: ICD-10-CM

## 2018-10-24 DIAGNOSIS — M79.7 FIBROMYALGIA: ICD-10-CM

## 2018-10-24 DIAGNOSIS — G89.29 CHRONIC MIDLINE LOW BACK PAIN WITH BILATERAL SCIATICA: ICD-10-CM

## 2018-10-24 DIAGNOSIS — M54.42 CHRONIC MIDLINE LOW BACK PAIN WITH BILATERAL SCIATICA: ICD-10-CM

## 2018-10-24 DIAGNOSIS — I10 ESSENTIAL HYPERTENSION: ICD-10-CM

## 2018-10-24 PROCEDURE — 99214 OFFICE O/P EST MOD 30 MIN: CPT | Performed by: FAMILY MEDICINE

## 2018-10-24 RX ORDER — TRAMADOL HYDROCHLORIDE 50 MG/1
50 TABLET ORAL EVERY 6 HOURS PRN
Qty: 30 TABLET | Refills: 0 | Status: SHIPPED | OUTPATIENT
Start: 2018-10-24 | End: 2018-11-26 | Stop reason: SDUPTHER

## 2018-10-24 RX ORDER — PANTOPRAZOLE SODIUM 40 MG/1
40 TABLET, DELAYED RELEASE ORAL DAILY
Qty: 90 TABLET | Refills: 0 | Status: SHIPPED | OUTPATIENT
Start: 2018-10-24 | End: 2019-02-04 | Stop reason: SDUPTHER

## 2018-10-24 RX ORDER — ISOSORBIDE MONONITRATE 30 MG/1
30 TABLET, EXTENDED RELEASE ORAL DAILY
Qty: 90 TABLET | Refills: 1 | Status: SHIPPED | OUTPATIENT
Start: 2018-10-24 | End: 2019-01-24 | Stop reason: SDUPTHER

## 2018-10-24 RX ORDER — BISOPROLOL FUMARATE AND HYDROCHLOROTHIAZIDE 5; 6.25 MG/1; MG/1
1 TABLET ORAL DAILY
Qty: 90 TABLET | Refills: 1 | Status: SHIPPED | OUTPATIENT
Start: 2018-10-24 | End: 2019-02-04 | Stop reason: SDUPTHER

## 2018-10-24 RX ORDER — CITALOPRAM 20 MG/1
20 TABLET ORAL DAILY
Qty: 30 TABLET | Refills: 3 | Status: SHIPPED | OUTPATIENT
Start: 2018-10-24 | End: 2019-02-04 | Stop reason: SDUPTHER

## 2018-10-24 NOTE — PROGRESS NOTES
"Subjective   Gaviota Evans is a 83 y.o. female.     Chief Complaint   Patient presents with   • Hypertension     3 month follow up   • Hyperlipidemia   • Anxiety       Visit Vitals  /70   Pulse 75   Temp 97.2 °F (36.2 °C)   Ht 157.5 cm (62\")   Wt 68.3 kg (150 lb 9.6 oz)   LMP  (LMP Unknown)   SpO2 96%   BMI 27.55 kg/m²         Hypertension   This is a chronic problem. The current episode started more than 1 year ago. The problem is unchanged. The problem is controlled. Associated symptoms include anxiety, malaise/fatigue and shortness of breath (occasional). Pertinent negatives include no blurred vision, chest pain, headaches, neck pain, orthopnea, palpitations, peripheral edema, PND or sweats. There are no associated agents to hypertension. Risk factors for coronary artery disease include dyslipidemia, family history, post-menopausal state and sedentary lifestyle. Current antihypertension treatment includes beta blockers and diuretics. The current treatment provides significant improvement. There are no compliance problems.  Hypertensive end-organ damage includes CAD/MI. There is no history of angina, kidney disease, CVA, heart failure, left ventricular hypertrophy, PVD or retinopathy. There is no history of chronic renal disease, sleep apnea or a thyroid problem.   Hyperlipidemia   This is a chronic problem. The current episode started more than 1 year ago. The problem is uncontrolled. Recent lipid tests were reviewed and are high. She has no history of chronic renal disease, diabetes, hypothyroidism, liver disease, obesity or nephrotic syndrome. Associated symptoms include leg pain, myalgias (fibromyalgia) and shortness of breath (occasional). Pertinent negatives include no chest pain, focal sensory loss or focal weakness. Current antihyperlipidemic treatment includes diet change. The current treatment provides mild (pending, does not tolerate statins) improvement of lipids. There are no compliance " problems.  Risk factors for coronary artery disease include dyslipidemia, family history, hypertension and post-menopausal.   Anxiety   Presents for follow-up visit. Symptoms include depressed mood (yesterday), muscle tension (fibromyalgia), nervous/anxious behavior and shortness of breath (occasional). Patient reports no chest pain, compulsions, confusion, decreased concentration, dizziness, dry mouth, excessive worry, feeling of choking, hyperventilation, insomnia, irritability, malaise, nausea, obsessions, palpitations, panic, restlessness or suicidal ideas. Symptoms occur most days. The severity of symptoms is mild. The quality of sleep is poor. Nighttime awakenings: several.     Compliance with medications is %.      Pt needs refill of meds for BP, lipids, back pain and knee pain.  Pt has been using compounded gel from [facility].     The following portions of the patient's history were reviewed and updated as appropriate: allergies, current medications, past family history, past medical history, past social history, past surgical history and problem list.    Past Medical History:   Diagnosis Date   • Atherosclerosis of abdominal aorta (CMS/HCC)    • Atrophic vaginitis    • B12 deficiency    • Rivera's esophagus    • Cardiovascular disease    • Chronic back pain     Chronic back pain with intermittent epidural injections.   • Chronic low back pain    • Coronary artery disease    • Fibromyalgia    • Fibromyalgia    • GERD (gastroesophageal reflux disease)    • H/O mammogram 12/2014   • Hammer toe    • Hiatal hernia    • Hyperlipidemia    • Hypertension    • Lower extremity pain     Lower extremity discomfort: Right ROXANA 1.1, left ROXANA 1.1.   • Normocytic anemia due to blood loss    • Obstructive sleep apnea    • Obstructive sleep apnea treated with continuous positive airway pressure (CPAP)     Obstructive sleep apnea with CPAP compliance.   • Osteoarthritis     severe   • Palpitations      Event monitor, December 2011: Revealed brief episodes of atrial tachycardia.    • Senile osteoporosis       Past Surgical History:   Procedure Laterality Date   • BLADDER REPAIR     • CHOLECYSTECTOMY      Status post cholecystectomy.   • COLONOSCOPY  01/2015   • CORONARY STENT PLACEMENT  05/2015    2 stents 80% RCA 12/2007, C 2/2011. no critical disease   • HYSTERECTOMY      partial   • LUMBAR FUSION  2006    L4-s1   • OTHER SURGICAL HISTORY      [facility]   • VAGINAL REPAIR        Family History   Problem Relation Age of Onset   • No Known Problems Sister    • Heart disease Sister    • Heart attack Sister 54   • Coronary artery disease Sister    • Hypertension Mother       Social History     Social History   • Marital status:      Spouse name: N/A   • Number of children: N/A   • Years of education: N/A     Occupational History   • Not on file.     Social History Main Topics   • Smoking status: Never Smoker   • Smokeless tobacco: Never Used   • Alcohol use No   • Drug use: No   • Sexual activity: No     Other Topics Concern   • Not on file     Social History Narrative   • No narrative on file        Review of Systems   Constitutional: Positive for fatigue and malaise/fatigue. Negative for chills, diaphoresis, fever and irritability.   HENT: Negative.  Negative for ear pain, nosebleeds, postnasal drip, rhinorrhea, sinus pressure, sneezing and sore throat.    Eyes: Negative.  Negative for blurred vision, redness and itching.   Respiratory: Positive for shortness of breath (occasional). Negative for cough and wheezing.    Cardiovascular: Negative.  Negative for chest pain, palpitations, orthopnea and PND.   Gastrointestinal: Negative.  Negative for abdominal pain, constipation, diarrhea, nausea and vomiting.   Endocrine: Positive for cold intolerance and heat intolerance.   Genitourinary: Negative.  Negative for dysuria, frequency, hematuria and urgency.   Musculoskeletal: Positive for arthralgias (both knees),  back pain, gait problem (using cane) and myalgias (fibromyalgia). Negative for neck pain.   Skin: Negative.  Negative for color change and rash.   Allergic/Immunologic: Negative.  Negative for environmental allergies.   Neurological: Negative for dizziness, focal weakness, syncope, light-headedness and headaches.   Hematological: Negative.  Negative for adenopathy. Does not bruise/bleed easily.   Psychiatric/Behavioral: Negative for confusion, decreased concentration, dysphoric mood and suicidal ideas. The patient is nervous/anxious. The patient does not have insomnia.        Objective   Physical Exam   Constitutional: She is oriented to person, place, and time. She appears well-developed.   HENT:   Head: Normocephalic.   Right Ear: External ear normal.   Left Ear: External ear normal.   Nose: Nose normal.   Eyes: Pupils are equal, round, and reactive to light. Conjunctivae, EOM and lids are normal.   Neck: Trachea normal and normal range of motion. Neck supple. Carotid bruit is not present. No thyroid mass and no thyromegaly present.   Cardiovascular: Normal rate and regular rhythm.    No murmur heard.  Pulmonary/Chest: Effort normal and breath sounds normal. No respiratory distress. She has no decreased breath sounds. She has no wheezes. She has no rhonchi. She has no rales. She exhibits no tenderness.   Abdominal: Soft. Bowel sounds are normal. There is no tenderness.   Musculoskeletal: Normal range of motion.        Left knee: She exhibits swelling. Tenderness found. Lateral joint line tenderness noted.        Thoracic back: She exhibits tenderness.        Lumbar back: She exhibits tenderness.   Neurological: She is alert and oriented to person, place, and time.   Skin: Skin is warm and dry.   Psychiatric: She has a normal mood and affect. Her behavior is normal.   Nursing note and vitals reviewed.      Assessment/Plan   Gaviota was seen today for hypertension, hyperlipidemia and anxiety.    Diagnoses and all  orders for this visit:    Chronic pain of both knees  -     traMADol (ULTRAM) 50 MG tablet; Take 1 tablet by mouth Every 6 (Six) Hours As Needed for Moderate Pain .  -     Ambulatory Referral to Pain Management    Chronic midline low back pain with bilateral sciatica  -     traMADol (ULTRAM) 50 MG tablet; Take 1 tablet by mouth Every 6 (Six) Hours As Needed for Moderate Pain .  -     Ambulatory Referral to Pain Management    Hyperlipidemia, unspecified hyperlipidemia type    Rivera's esophagus without dysplasia  -     pantoprazole (PROTONIX) 40 MG EC tablet; Take 1 tablet by mouth Daily.    Gastroesophageal reflux disease with esophagitis  -     pantoprazole (PROTONIX) 40 MG EC tablet; Take 1 tablet by mouth Daily.    ASCVD (arteriosclerotic cardiovascular disease)  -     isosorbide mononitrate (IMDUR) 30 MG 24 hr tablet; Take 1 tablet by mouth Daily.    Essential hypertension  -     bisoprolol-hydrochlorothiazide (ZIAC) 5-6.25 MG per tablet; Take 1 tablet by mouth Daily.  -     Comprehensive Metabolic Panel  -     Lipid Panel  -     TSH  -     CBC & Differential    Anxiety with depression  -     citalopram (CeleXA) 20 MG tablet; Take 1 tablet by mouth Daily.    Abnormal glucose  -     Hemoglobin A1c    Fibromyalgia  -     Sedimentation Rate    refill pain cream from [facility]    Pt advised that we will not write for controlled substances in the future. Referral will be made to Pain Management     Pt had flu shot at her local pharmacy         Current Outpatient Prescriptions:   •  aspirin 81 MG EC tablet, Take 81 mg by mouth daily., Disp: , Rfl:   •  bisoprolol-hydrochlorothiazide (ZIAC) 5-6.25 MG per tablet, Take 1 tablet by mouth Daily., Disp: 90 tablet, Rfl: 1  •  citalopram (CeleXA) 20 MG tablet, Take 1 tablet by mouth Daily., Disp: 30 tablet, Rfl: 3  •  Coenzyme Q10 (CO Q 10 PO), Take  by mouth Daily., Disp: , Rfl:   •  cyanocobalamin 1000 MCG/ML injection, Inject 1 mL into the shoulder,  thigh, or buttocks Every 14 (Fourteen) Days., Disp: 6 mL, Rfl: 2  •  diclofenac (VOLTAREN) 75 MG EC tablet, TAKE ONE TABLET BY MOUTH TWICE A DAY AS NEEDED FOR PAIN, Disp: 60 tablet, Rfl: 0  •  isosorbide mononitrate (IMDUR) 30 MG 24 hr tablet, Take 1 tablet by mouth Daily., Disp: 90 tablet, Rfl: 1  •  nitroglycerin (NITROSTAT) 0.4 MG SL tablet, Place 1 tablet under the tongue As Needed for Chest Pain. Prn chest pain, Disp: 25 tablet, Rfl: 11  •  pantoprazole (PROTONIX) 40 MG EC tablet, Take 1 tablet by mouth Daily., Disp: 90 tablet, Rfl: 0  •  prednisoLONE acetate (PRED FORTE) 1 % ophthalmic suspension, Administer 1 drop into the left eye 2 (two) times a week., Disp: , Rfl:   •  rOPINIRole (REQUIP) 0.25 MG tablet, Take 1 tablet by mouth Every Night. Take 1 hour before bedtime., Disp: 30 tablet, Rfl: 2  •  simethicone (PHAZYME) 125 MG chewable tablet, Chew 1 tablet Every 6 (Six) Hours As Needed for Flatulence., Disp: 60 tablet, Rfl: 1  •  traMADol (ULTRAM) 50 MG tablet, Take 1 tablet by mouth Every 6 (Six) Hours As Needed for Moderate Pain ., Disp: 30 tablet, Rfl: 0  •  TURMERIC PO, Take  by mouth Daily., Disp: , Rfl:   •  vitamin E 400 UNIT capsule, Take 400 Units by mouth Daily. 3 tablets daily, Disp: , Rfl:     Return in about 3 months (around 1/24/2019), or if symptoms worsen or fail to improve, for Recheck.

## 2018-10-25 LAB
ALBUMIN SERPL-MCNC: 4.1 G/DL (ref 3.2–4.8)
ALBUMIN/GLOB SERPL: 1.6 G/DL (ref 1.5–2.5)
ALP SERPL-CCNC: 86 U/L (ref 25–100)
ALT SERPL-CCNC: 16 U/L (ref 7–40)
AST SERPL-CCNC: 23 U/L (ref 0–33)
BASOPHILS # BLD AUTO: 0.03 10*3/MM3 (ref 0–0.2)
BASOPHILS NFR BLD AUTO: 0.4 % (ref 0–1)
BILIRUB SERPL-MCNC: 0.4 MG/DL (ref 0.3–1.2)
BUN SERPL-MCNC: 14 MG/DL (ref 9–23)
BUN/CREAT SERPL: 14.4 (ref 7–25)
CALCIUM SERPL-MCNC: 9.3 MG/DL (ref 8.7–10.4)
CHLORIDE SERPL-SCNC: 98 MMOL/L (ref 99–109)
CHOLEST SERPL-MCNC: 218 MG/DL (ref 0–200)
CO2 SERPL-SCNC: 30 MMOL/L (ref 20–31)
CREAT SERPL-MCNC: 0.97 MG/DL (ref 0.6–1.3)
EOSINOPHIL # BLD AUTO: 0.25 10*3/MM3 (ref 0–0.3)
EOSINOPHIL NFR BLD AUTO: 3.5 % (ref 0–3)
ERYTHROCYTE [DISTWIDTH] IN BLOOD BY AUTOMATED COUNT: 16.9 % (ref 11.3–14.5)
ERYTHROCYTE [SEDIMENTATION RATE] IN BLOOD BY WESTERGREN METHOD: 37 MM/HR (ref 0–30)
GLOBULIN SER CALC-MCNC: 2.6 GM/DL
GLUCOSE SERPL-MCNC: 80 MG/DL (ref 70–100)
HBA1C MFR BLD: 6.1 % (ref 4.8–5.6)
HCT VFR BLD AUTO: 38.1 % (ref 34.5–44)
HDLC SERPL-MCNC: 51 MG/DL (ref 40–60)
HGB BLD-MCNC: 12 G/DL (ref 11.5–15.5)
IMM GRANULOCYTES # BLD: 0.01 10*3/MM3 (ref 0–0.03)
IMM GRANULOCYTES NFR BLD: 0.1 % (ref 0–0.6)
LDLC SERPL CALC-MCNC: 142 MG/DL (ref 0–100)
LYMPHOCYTES # BLD AUTO: 2.71 10*3/MM3 (ref 0.6–4.8)
LYMPHOCYTES NFR BLD AUTO: 38.1 % (ref 24–44)
MCH RBC QN AUTO: 27.1 PG (ref 27–31)
MCHC RBC AUTO-ENTMCNC: 31.5 G/DL (ref 32–36)
MCV RBC AUTO: 86.2 FL (ref 80–99)
MONOCYTES # BLD AUTO: 0.84 10*3/MM3 (ref 0–1)
MONOCYTES NFR BLD AUTO: 11.8 % (ref 0–12)
NEUTROPHILS # BLD AUTO: 3.28 10*3/MM3 (ref 1.5–8.3)
NEUTROPHILS NFR BLD AUTO: 46.1 % (ref 41–71)
PLATELET # BLD AUTO: 331 10*3/MM3 (ref 150–450)
POTASSIUM SERPL-SCNC: 4.1 MMOL/L (ref 3.5–5.5)
PROT SERPL-MCNC: 6.7 G/DL (ref 5.7–8.2)
RBC # BLD AUTO: 4.42 10*6/MM3 (ref 3.89–5.14)
SODIUM SERPL-SCNC: 134 MMOL/L (ref 132–146)
TRIGL SERPL-MCNC: 127 MG/DL (ref 0–150)
TSH SERPL DL<=0.005 MIU/L-ACNC: 3.61 MIU/ML (ref 0.35–5.35)
VLDLC SERPL CALC-MCNC: 25.4 MG/DL
WBC # BLD AUTO: 7.12 10*3/MM3 (ref 3.5–10.8)

## 2018-11-26 ENCOUNTER — OFFICE VISIT (OUTPATIENT)
Dept: INTERNAL MEDICINE | Facility: CLINIC | Age: 83
End: 2018-11-26

## 2018-11-26 VITALS
HEART RATE: 96 BPM | OXYGEN SATURATION: 98 % | BODY MASS INDEX: 27.2 KG/M2 | DIASTOLIC BLOOD PRESSURE: 52 MMHG | WEIGHT: 147.8 LBS | TEMPERATURE: 97.3 F | HEIGHT: 62 IN | SYSTOLIC BLOOD PRESSURE: 104 MMHG

## 2018-11-26 DIAGNOSIS — G89.29 CHRONIC MIDLINE LOW BACK PAIN WITH BILATERAL SCIATICA: ICD-10-CM

## 2018-11-26 DIAGNOSIS — M54.41 CHRONIC MIDLINE LOW BACK PAIN WITH BILATERAL SCIATICA: ICD-10-CM

## 2018-11-26 DIAGNOSIS — M25.562 CHRONIC PAIN OF BOTH KNEES: ICD-10-CM

## 2018-11-26 DIAGNOSIS — B02.9 HERPES ZOSTER WITHOUT COMPLICATION: Primary | ICD-10-CM

## 2018-11-26 DIAGNOSIS — G89.29 CHRONIC PAIN OF BOTH KNEES: ICD-10-CM

## 2018-11-26 DIAGNOSIS — M25.561 CHRONIC PAIN OF BOTH KNEES: ICD-10-CM

## 2018-11-26 DIAGNOSIS — M54.42 CHRONIC MIDLINE LOW BACK PAIN WITH BILATERAL SCIATICA: ICD-10-CM

## 2018-11-26 PROCEDURE — 99213 OFFICE O/P EST LOW 20 MIN: CPT | Performed by: FAMILY MEDICINE

## 2018-11-26 RX ORDER — METHYLPREDNISOLONE 4 MG/1
TABLET ORAL
COMMUNITY
Start: 2018-11-23 | End: 2019-02-04

## 2018-11-26 RX ORDER — ACETAMINOPHEN AND CODEINE PHOSPHATE 300; 30 MG/1; MG/1
TABLET ORAL
COMMUNITY
Start: 2018-11-25 | End: 2019-08-14

## 2018-11-26 RX ORDER — ACYCLOVIR 800 MG/1
TABLET ORAL
COMMUNITY
Start: 2018-11-25 | End: 2019-02-04

## 2018-11-26 RX ORDER — NITROGLYCERIN 0.4 MG/1
0.4 TABLET SUBLINGUAL AS NEEDED
Qty: 25 TABLET | Refills: 5 | Status: SHIPPED | OUTPATIENT
Start: 2018-11-26 | End: 2021-07-21

## 2018-11-26 RX ORDER — GABAPENTIN 100 MG/1
100 CAPSULE ORAL 3 TIMES DAILY
Qty: 15 CAPSULE | Refills: 1 | Status: SHIPPED | OUTPATIENT
Start: 2018-11-26 | End: 2019-02-04 | Stop reason: SINTOL

## 2018-11-26 RX ORDER — TRAMADOL HYDROCHLORIDE 50 MG/1
50 TABLET ORAL EVERY 6 HOURS PRN
Qty: 30 TABLET | Refills: 0 | Status: SHIPPED | OUTPATIENT
Start: 2018-11-26 | End: 2019-01-11 | Stop reason: SDUPTHER

## 2018-11-26 NOTE — PATIENT INSTRUCTIONS
Do not mix tramadol with cyclobenzaprine.  Take gabapentin with food.   Gabapentin can be sedating.         Shingles  Shingles is an infection that causes a painful skin rash and fluid-filled blisters. Shingles is caused by the same virus that causes chickenpox.  Shingles only develops in people who:  · Have had chickenpox.  · Have gotten the chickenpox vaccine. (This is rare.)    The first symptoms of shingles may be itching, tingling, or pain in an area on your skin. A rash will follow in a few days or weeks. The rash is usually on one side of the body in a bandlike or beltlike pattern. Over time, the rash turns into fluid-filled blisters that break open, scab over, and dry up. Medicines may:  · Help you manage pain.  · Help you recover more quickly.  · Help to prevent long-term problems.    Follow these instructions at home:  Medicines  · Take medicines only as told by your doctor.  · Apply an anti-itch or numbing cream to the affected area as told by your doctor.  Blister and Rash Care  · Take a cool bath or put cool compresses on the area of the rash or blisters as told by your doctor. This may help with pain and itching.  · Keep your rash covered with a loose bandage (dressing). Wear loose-fitting clothing.  · Keep your rash and blisters clean with mild soap and cool water or as told by your doctor.  · Check your rash every day for signs of infection. These include redness, swelling, and pain that lasts or gets worse.  · Do not pick your blisters.  · Do not scratch your rash.  General instructions  · Rest as told by your doctor.  · Keep all follow-up visits as told by your doctor. This is important.  · Until your blisters scab over, your infection can cause chickenpox in people who have never had it or been vaccinated against it. To prevent this from happening, avoid touching other people or being around other people, especially:  ? Babies.  ? Pregnant women.  ? Children who have eczema.  ? Elderly people who  have transplants.  ? People who have chronic illnesses, such as leukemia or AIDS.  Contact a doctor if:  · Your pain does not get better with medicine.  · Your pain does not get better after the rash heals.  · Your rash looks infected. Signs of infection include:  ? Redness.  ? Swelling.  ? Pain that lasts or gets worse.  Get help right away if:  · The rash is on your face or nose.  · You have pain in your face, pain around your eye area, or loss of feeling on one side of your face.  · You have ear pain or you have ringing in your ear.  · You have loss of taste.  · Your condition gets worse.  This information is not intended to replace advice given to you by your health care provider. Make sure you discuss any questions you have with your health care provider.  Document Released: 06/05/2009 Document Revised: 08/13/2017 Document Reviewed: 09/29/2015  [employer] Interactive Patient Education © 2018 [employer]

## 2018-11-26 NOTE — PROGRESS NOTES
"Subjective   Gaviota Evans is a 83 y.o. female.     Chief Complaint   Patient presents with   • Possible shingles     Left lower leg. Josette Patino from [facility] dx'd with shingles. Prescribed Acyclovir, has been taking for 3 days.        Visit Vitals  /52 (BP Location: Left arm, Patient Position: Sitting)   Pulse 96   Temp 97.3 °F (36.3 °C)   Ht 157.5 cm (62.01\")   Wt 67 kg (147 lb 12.8 oz)   LMP  (LMP Unknown)   SpO2 98%   BMI 27.02 kg/m²         Rash   The affected locations include the left lower leg (left knee). The rash is characterized by blistering and pain. She was exposed to nothing. Pertinent negatives include no anorexia, congestion, cough, diarrhea, eye pain, facial edema, fatigue, fever, joint pain, nail changes, rhinorrhea, shortness of breath, sore throat or vomiting. Treatments tried: acyclovir. The treatment provided moderate relief. Her past medical history is significant for varicella. There is no history of allergies, asthma or eczema.      Pt was diagnosed with shingles this past week at [facility] and started on acyclovir.   Pt did not tolerate the lortab that the dentist gave her.  Pt has problems with the Tyl#3 that ER gave her.  Pt has flexeril from ER.  Pt has GI intolerance to Lyrica, but according to can take gabapentin.    Pt needs refill of NTG    The following portions of the patient's history were reviewed and updated as appropriate: allergies, current medications, past family history, past medical history, past social history, past surgical history and problem list.    Past Medical History:   Diagnosis Date   • Atherosclerosis of abdominal aorta (CMS/HCC)    • Atrophic vaginitis    • B12 deficiency    • Rivera's esophagus    • Cardiovascular disease    • Chronic back pain     Chronic back pain with intermittent epidural injections.   • Chronic low back pain    • Coronary artery disease    • Fibromyalgia    • Fibromyalgia    • GERD (gastroesophageal reflux " disease)    • H/O mammogram 12/2014   • Hammer toe    • Hiatal hernia    • Hyperlipidemia    • Hypertension    • Lower extremity pain     Lower extremity discomfort: Right ROXANA 1.1, left ROXANA 1.1.   • Normocytic anemia due to blood loss    • Obstructive sleep apnea    • Obstructive sleep apnea treated with continuous positive airway pressure (CPAP)     Obstructive sleep apnea with CPAP compliance.   • Osteoarthritis     severe   • Palpitations     Event monitor, December 2011: Revealed brief episodes of atrial tachycardia.    • Senile osteoporosis       Past Surgical History:   Procedure Laterality Date   • BLADDER REPAIR     • CHOLECYSTECTOMY      Status post cholecystectomy.   • COLONOSCOPY  01/2015   • CORONARY STENT PLACEMENT  05/2015    2 stents 80% RCA 12/2007, LHC 2/2011. no critical disease   • HYSTERECTOMY      partial   • LUMBAR FUSION  2006    L4-s1   • OTHER SURGICAL HISTORY      [facility]   • VAGINAL REPAIR        Family History   Problem Relation Age of Onset   • No Known Problems Sister    • Heart disease Sister    • Heart attack Sister 54   • Coronary artery disease Sister    • Hypertension Mother       Social History     Socioeconomic History   • Marital status:      Spouse name: Not on file   • Number of children: Not on file   • Years of education: Not on file   • Highest education level: Not on file   Social Needs   • Financial resource strain: Not on file   • Food insecurity - worry: Not on file   • Food insecurity - inability: Not on file   • Transportation needs - medical: Not on file   • Transportation needs - non-medical: Not on file   Occupational History   • Not on file   Tobacco Use   • Smoking status: Never Smoker   • Smokeless tobacco: Never Used   Substance and Sexual Activity   • Alcohol use: No   • Drug use: No   • Sexual activity: No   Other Topics Concern   • Not on file   Social History Narrative   • Not on file        Review of Systems   Constitutional: Negative.  Negative for  chills, diaphoresis, fatigue and fever.   HENT: Positive for tinnitus (chronic). Negative for congestion, ear pain, nosebleeds, postnasal drip, rhinorrhea, sinus pressure, sneezing and sore throat.    Eyes: Negative.  Negative for pain, redness and itching.   Respiratory: Negative.  Negative for cough, shortness of breath and wheezing.    Cardiovascular: Negative.  Negative for chest pain and palpitations.   Gastrointestinal: Positive for nausea. Negative for abdominal pain, anorexia, constipation, diarrhea and vomiting.   Endocrine: Negative.  Negative for cold intolerance and heat intolerance.   Genitourinary: Negative.  Negative for dysuria, frequency, hematuria and urgency.   Musculoskeletal: Negative.  Negative for arthralgias, back pain, joint pain and neck pain.   Skin: Positive for rash. Negative for color change and nail changes.   Allergic/Immunologic: Negative.  Negative for environmental allergies.   Neurological: Positive for tremors (shaky this morning) and light-headedness. Negative for dizziness, syncope and headaches.   Hematological: Negative.  Negative for adenopathy. Does not bruise/bleed easily.   Psychiatric/Behavioral: Negative.  Negative for dysphoric mood. The patient is not nervous/anxious.        Objective   Physical Exam   Constitutional: She is oriented to person, place, and time. She appears well-developed.   HENT:   Head: Normocephalic.   Right Ear: External ear normal.   Left Ear: External ear normal.   Nose: Nose normal.   Eyes: Conjunctivae, EOM and lids are normal. Pupils are equal, round, and reactive to light.   Neck: Trachea normal and normal range of motion. Neck supple. Carotid bruit is not present. No thyroid mass and no thyromegaly present.   Cardiovascular: Normal rate and regular rhythm.   No murmur heard.  Pulmonary/Chest: Effort normal and breath sounds normal. No respiratory distress. She has no decreased breath sounds. She has no wheezes. She has no rhonchi. She has no  rales. She exhibits no tenderness.   Abdominal: Soft. Bowel sounds are normal. There is no tenderness.   Musculoskeletal: Normal range of motion.   Neurological: She is alert and oriented to person, place, and time.   Skin: Skin is warm and dry. Rash noted. Rash is vesicular.        Psychiatric: She has a normal mood and affect. Her behavior is normal.   Nursing note and vitals reviewed.      Assessment/Plan   Gaviota was seen today for possible shingles.    Diagnoses and all orders for this visit:    Herpes zoster without complication    Chronic midline low back pain with bilateral sciatica  -     traMADol (ULTRAM) 50 MG tablet; Take 1 tablet by mouth Every 6 (Six) Hours As Needed for Moderate Pain .    Chronic pain of both knees  -     traMADol (ULTRAM) 50 MG tablet; Take 1 tablet by mouth Every 6 (Six) Hours As Needed for Moderate Pain .    Other orders  -     gabapentin (NEURONTIN) 100 MG capsule; Take 1 capsule by mouth 3 (Three) Times a Day.  -     nitroglycerin (NITROSTAT) 0.4 MG SL tablet; Place 1 tablet under the tongue As Needed for Chest Pain. Prn chest pain      Finish acyclovir  Do not mix tramadol with cyclobenzaprine.  Take gabapentin with food.      Discussed Shingrix vaccine with pt,  that is currently available at the pharmacy.       Current Outpatient Medications:   •  acetaminophen-codeine (TYLENOL #3) 300-30 MG per tablet, , Disp: , Rfl:   •  acyclovir (ZOVIRAX) 800 MG tablet, , Disp: , Rfl:   •  aspirin 81 MG EC tablet, Take 81 mg by mouth daily., Disp: , Rfl:   •  bisoprolol-hydrochlorothiazide (ZIAC) 5-6.25 MG per tablet, Take 1 tablet by mouth Daily., Disp: 90 tablet, Rfl: 1  •  citalopram (CeleXA) 20 MG tablet, Take 1 tablet by mouth Daily., Disp: 30 tablet, Rfl: 3  •  Coenzyme Q10 (CO Q 10 PO), Take  by mouth Daily., Disp: , Rfl:   •  cyanocobalamin 1000 MCG/ML injection, Inject 1 mL into the shoulder, thigh, or buttocks Every 14 (Fourteen) Days., Disp: 6 mL, Rfl: 2  •  diclofenac (VOLTAREN)  75 MG EC tablet, TAKE ONE TABLET BY MOUTH TWICE A DAY AS NEEDED FOR PAIN, Disp: 60 tablet, Rfl: 0  •  isosorbide mononitrate (IMDUR) 30 MG 24 hr tablet, Take 1 tablet by mouth Daily., Disp: 90 tablet, Rfl: 1  •  MethylPREDNISolone (MEDROL, ROB,) 4 MG tablet, , Disp: , Rfl:   •  pantoprazole (PROTONIX) 40 MG EC tablet, Take 1 tablet by mouth Daily., Disp: 90 tablet, Rfl: 0  •  prednisoLONE acetate (PRED FORTE) 1 % ophthalmic suspension, Administer 1 drop into the left eye 2 (two) times a week., Disp: , Rfl:   •  rOPINIRole (REQUIP) 0.25 MG tablet, Take 1 tablet by mouth Every Night. Take 1 hour before bedtime., Disp: 30 tablet, Rfl: 2  •  simethicone (PHAZYME) 125 MG chewable tablet, Chew 1 tablet Every 6 (Six) Hours As Needed for Flatulence., Disp: 60 tablet, Rfl: 1  •  TURMERIC PO, Take  by mouth Daily., Disp: , Rfl:   •  vitamin E 400 UNIT capsule, Take 400 Units by mouth Daily. 3 tablets daily, Disp: , Rfl:   •  gabapentin (NEURONTIN) 100 MG capsule, Take 1 capsule by mouth 3 (Three) Times a Day., Disp: 15 capsule, Rfl: 1  •  nitroglycerin (NITROSTAT) 0.4 MG SL tablet, Place 1 tablet under the tongue As Needed for Chest Pain. Prn chest pain, Disp: 25 tablet, Rfl: 5  •  traMADol (ULTRAM) 50 MG tablet, Take 1 tablet by mouth Every 6 (Six) Hours As Needed for Moderate Pain ., Disp: 30 tablet, Rfl: 0    Return if symptoms worsen or fail to improve, for Recheck, Next scheduled follow up.

## 2018-12-04 ENCOUNTER — TELEPHONE (OUTPATIENT)
Dept: INTERNAL MEDICINE | Facility: CLINIC | Age: 83
End: 2018-12-04

## 2018-12-04 NOTE — TELEPHONE ENCOUNTER
Pt was notified that we would no longer write for her tramadol. She was referred to pain mgt in October but has declined the referral. She was advised that we could no longer help since she declined pain mgt, at that time she hung up without listening to the rest of advise.

## 2018-12-27 ENCOUNTER — TELEPHONE (OUTPATIENT)
Dept: INTERNAL MEDICINE | Facility: CLINIC | Age: 83
End: 2018-12-27

## 2018-12-27 DIAGNOSIS — M79.606 PAIN OF LOWER EXTREMITY, UNSPECIFIED LATERALITY: Primary | ICD-10-CM

## 2018-12-27 DIAGNOSIS — M54.5 CHRONIC MIDLINE LOW BACK PAIN, WITH SCIATICA PRESENCE UNSPECIFIED: ICD-10-CM

## 2018-12-27 DIAGNOSIS — Z86.19 H/O HERPES ZOSTER: ICD-10-CM

## 2018-12-27 DIAGNOSIS — G89.29 CHRONIC MIDLINE LOW BACK PAIN, WITH SCIATICA PRESENCE UNSPECIFIED: ICD-10-CM

## 2018-12-28 NOTE — TELEPHONE ENCOUNTER
"Pt states she is having some difficulty with chores around the house, cleaning and doing laundry. She states that the shingles is really \"put her down\" and she isn't able to take of things around the house.   "

## 2018-12-31 ENCOUNTER — TELEPHONE (OUTPATIENT)
Dept: INTERNAL MEDICINE | Facility: CLINIC | Age: 83
End: 2018-12-31

## 2018-12-31 DIAGNOSIS — M54.42 CHRONIC MIDLINE LOW BACK PAIN WITH BILATERAL SCIATICA: Primary | ICD-10-CM

## 2018-12-31 DIAGNOSIS — G89.29 CHRONIC PAIN OF BOTH KNEES: ICD-10-CM

## 2018-12-31 DIAGNOSIS — M54.41 CHRONIC MIDLINE LOW BACK PAIN WITH BILATERAL SCIATICA: Primary | ICD-10-CM

## 2018-12-31 DIAGNOSIS — M25.561 CHRONIC PAIN OF BOTH KNEES: ICD-10-CM

## 2018-12-31 DIAGNOSIS — G89.29 CHRONIC MIDLINE LOW BACK PAIN WITH BILATERAL SCIATICA: Primary | ICD-10-CM

## 2018-12-31 DIAGNOSIS — M25.562 CHRONIC PAIN OF BOTH KNEES: ICD-10-CM

## 2018-12-31 NOTE — TELEPHONE ENCOUNTER
Patient is requesting a pain referral to Dr. Donato Tucker.  Her call back number is  [phone]

## 2019-01-07 ENCOUNTER — TELEPHONE (OUTPATIENT)
Dept: INTERNAL MEDICINE | Facility: CLINIC | Age: 84
End: 2019-01-07

## 2019-01-07 DIAGNOSIS — G25.81 RESTLESS LEG: ICD-10-CM

## 2019-01-07 NOTE — TELEPHONE ENCOUNTER
MS LEN REFUSED [facility] SOC SHE WOULD LIKE A REFERRAL TO PAIN CLINIC IN [place], [place]. MS WOULD LIKE A CALL BACK

## 2019-01-08 RX ORDER — ROPINIROLE 0.25 MG/1
TABLET, FILM COATED ORAL
Qty: 60 TABLET | Refills: 1 | Status: SHIPPED | OUTPATIENT
Start: 2019-01-08 | End: 2019-02-04 | Stop reason: SDUPTHER

## 2019-01-11 ENCOUNTER — TELEPHONE (OUTPATIENT)
Dept: INTERNAL MEDICINE | Facility: CLINIC | Age: 84
End: 2019-01-11

## 2019-01-11 DIAGNOSIS — M54.41 CHRONIC MIDLINE LOW BACK PAIN WITH BILATERAL SCIATICA: ICD-10-CM

## 2019-01-11 DIAGNOSIS — G89.29 CHRONIC MIDLINE LOW BACK PAIN WITH BILATERAL SCIATICA: ICD-10-CM

## 2019-01-11 DIAGNOSIS — M25.561 CHRONIC PAIN OF BOTH KNEES: ICD-10-CM

## 2019-01-11 DIAGNOSIS — G89.29 CHRONIC PAIN OF BOTH KNEES: ICD-10-CM

## 2019-01-11 DIAGNOSIS — M54.42 CHRONIC MIDLINE LOW BACK PAIN WITH BILATERAL SCIATICA: ICD-10-CM

## 2019-01-11 DIAGNOSIS — M25.562 CHRONIC PAIN OF BOTH KNEES: ICD-10-CM

## 2019-01-11 RX ORDER — TRAMADOL HYDROCHLORIDE 50 MG/1
50 TABLET ORAL EVERY 6 HOURS PRN
Qty: 60 TABLET | Refills: 0 | Status: SHIPPED | OUTPATIENT
Start: 2019-01-11

## 2019-01-11 RX ORDER — AMOXICILLIN AND CLAVULANATE POTASSIUM 875; 125 MG/1; MG/1
1 TABLET, FILM COATED ORAL 2 TIMES DAILY
Qty: 20 TABLET | Refills: 0 | Status: SHIPPED | OUTPATIENT
Start: 2019-01-11 | End: 2019-01-14 | Stop reason: SINTOL

## 2019-01-11 NOTE — TELEPHONE ENCOUNTER
Patient has pain management apt on 2/6 but needs to have her medication for her legs refilled. Patient also would like something called in for a bladder infection. The medication that Dr Sorto always gives her. No clue what the name of it is.

## 2019-01-11 NOTE — TELEPHONE ENCOUNTER
Pt states that she has a pain mgt appt but will be out of meds, wanted to know if you could write until she can be seen

## 2019-01-14 RX ORDER — DOXYCYCLINE 100 MG/1
100 CAPSULE ORAL 2 TIMES DAILY
Qty: 20 CAPSULE | Refills: 0 | Status: SHIPPED | OUTPATIENT
Start: 2019-01-14 | End: 2019-02-04

## 2019-01-14 NOTE — TELEPHONE ENCOUNTER
Pt notified, she received her tramadol but pharmacist said she had augmentin on her list of allergies. I have already added this allergy.

## 2019-01-24 DIAGNOSIS — I25.10 ASCVD (ARTERIOSCLEROTIC CARDIOVASCULAR DISEASE): ICD-10-CM

## 2019-01-24 RX ORDER — ISOSORBIDE MONONITRATE 30 MG/1
30 TABLET, EXTENDED RELEASE ORAL DAILY
Qty: 90 TABLET | Refills: 0 | Status: SHIPPED | OUTPATIENT
Start: 2019-01-24 | End: 2019-06-10 | Stop reason: SDUPTHER

## 2019-01-29 ENCOUNTER — OFFICE VISIT (OUTPATIENT)
Dept: ORTHOPEDIC SURGERY | Facility: CLINIC | Age: 84
End: 2019-01-29

## 2019-01-29 VITALS — BODY MASS INDEX: 26.37 KG/M2 | OXYGEN SATURATION: 99 % | HEART RATE: 74 BPM | HEIGHT: 62 IN | WEIGHT: 143.3 LBS

## 2019-01-29 DIAGNOSIS — M17.12 PRIMARY OSTEOARTHRITIS OF LEFT KNEE: Primary | ICD-10-CM

## 2019-01-29 PROCEDURE — 99213 OFFICE O/P EST LOW 20 MIN: CPT | Performed by: PHYSICIAN ASSISTANT

## 2019-01-29 RX ORDER — NITROFURANTOIN 25; 75 MG/1; MG/1
CAPSULE ORAL
COMMUNITY
Start: 2018-12-26 | End: 2019-02-04

## 2019-01-29 RX ORDER — LIDOCAINE 50 MG/G
PATCH TOPICAL
COMMUNITY
Start: 2018-12-31 | End: 2019-02-06 | Stop reason: SDUPTHER

## 2019-01-29 RX ORDER — LEVOFLOXACIN 250 MG/1
TABLET ORAL
COMMUNITY
Start: 2018-12-21 | End: 2020-04-14

## 2019-01-29 RX ORDER — DIAZEPAM 5 MG/1
TABLET ORAL
COMMUNITY
Start: 2019-01-24 | End: 2019-02-04

## 2019-01-29 NOTE — PROGRESS NOTES
[facility] Note    Subjective     Patient: Gaviota Evans  : 1935    Primary Care Provider: Angela Sorto MD    Requesting Provider: As above    Follow-up of the Left Knee (Primary Osteoarthritis of Left Knee/4 month f/u)      History    Chief Complaint: Left knee pain    History of Present Illness: Patient returns today with increasing left knee pain.  She has known end-stage left knee arthritis.  She complains of pain with walking and weightbearing with occasional night pain.  She reports pain is worse with climbing stairs.  She describes pain to be 8/10 and aching burning and throbbing in nature.  Overall it is worsening.  She takes tramadol and Voltaren for the pain.  She's been treated in the past with steroid injection, bracing, anti-inflammatories, cane use, Visco supplementation.  She reports that the Visco gives her the most relief that 60% for nearly 6 months.  She is not interested knee replacement and like repeat injections.    Current Outpatient Medications on File Prior to Visit   Medication Sig Dispense Refill   • acetaminophen-codeine (TYLENOL #3) 300-30 MG per tablet      • acyclovir (ZOVIRAX) 800 MG tablet      • aspirin 81 MG EC tablet Take 81 mg by mouth daily.     • bisoprolol-hydrochlorothiazide (ZIAC) 5-6.25 MG per tablet Take 1 tablet by mouth Daily. 90 tablet 1   • citalopram (CeleXA) 20 MG tablet Take 1 tablet by mouth Daily. 30 tablet 3   • Coenzyme Q10 (CO Q 10 PO) Take  by mouth Daily.     • cyanocobalamin 1000 MCG/ML injection Inject 1 mL into the shoulder, thigh, or buttocks Every 14 (Fourteen) Days. 6 mL 2   • diazePAM (VALIUM) 5 MG tablet      • diclofenac (VOLTAREN) 75 MG EC tablet TAKE ONE TABLET BY MOUTH TWICE A DAY AS NEEDED FOR PAIN 60 tablet 0   • doxycycline (MONODOX) 100 MG capsule Take 1 capsule by mouth 2 (Two) Times a Day. 20 capsule 0   • gabapentin (NEURONTIN) 100 MG capsule Take 1 capsule by mouth 3 (Three) Times a Day. 15  capsule 1   • isosorbide mononitrate (IMDUR) 30 MG 24 hr tablet Take 1 tablet by mouth Daily. 90 tablet 0   • levoFLOXacin (LEVAQUIN) 250 MG tablet      • lidocaine (LIDODERM) 5 %      • MethylPREDNISolone (MEDROL, ROB,) 4 MG tablet      • nitrofurantoin, macrocrystal-monohydrate, (MACROBID) 100 MG capsule      • nitroglycerin (NITROSTAT) 0.4 MG SL tablet Place 1 tablet under the tongue As Needed for Chest Pain. Prn chest pain 25 tablet 5   • pantoprazole (PROTONIX) 40 MG EC tablet Take 1 tablet by mouth Daily. 90 tablet 0   • prednisoLONE acetate (PRED FORTE) 1 % ophthalmic suspension Administer 1 drop into the left eye 2 (two) times a week.     • rOPINIRole (REQUIP) 0.25 MG tablet TAKE ONE TABLET BY MOUTH EVERY NIGHT AT BEDTIME--TAKE ONE HOUR BEFORE BEDTIME 60 tablet 1   • simethicone (PHAZYME) 125 MG chewable tablet Chew 1 tablet Every 6 (Six) Hours As Needed for Flatulence. 60 tablet 1   • traMADol (ULTRAM) 50 MG tablet Take 1 tablet by mouth Every 6 (Six) Hours As Needed for Moderate Pain . 60 tablet 0   • TURMERIC PO Take  by mouth Daily.     • vitamin E 400 UNIT capsule Take 400 Units by mouth Daily. 3 tablets daily       No current facility-administered medications on file prior to visit.       Allergies   Allergen Reactions   • Statins    • Ambien [Zolpidem Tartrate] Other (See Comments)     amnestic   • Arthrotec [Diclofenac-Misoprostol] GI Intolerance   • Aspirin GI Intolerance   • Augmentin [Amoxicillin-Pot Clavulanate] Unknown (See Comments)     unknown   • Bextra [Valdecoxib] GI Intolerance   • Ciprofloxacin    • Codeine GI Intolerance   • Crestor [Rosuvastatin] Other (See Comments)     Rxn not known   • Darvon [Propoxyphene] GI Intolerance   • Effient [Prasugrel] Other (See Comments)     Tongue swelling   • Lescol [Fluvastatin] Other (See Comments)     Rxn not known   • Lexapro [Escitalopram Oxalate] Nausea Only   • Lyrica [Pregabalin] GI Bleeding and GI Intolerance   • Macrobid [Nitrofurantoin] Other  (See Comments)     Pain/cramping   • Percocet [Oxycodone-Acetaminophen] Mental Status Change     depression   • Plavix [Clopidogrel Bisulfate] Swelling     Tongue swelling   • Pravachol [Pravastatin Sodium] Other (See Comments)     Rxn not known   • Vioxx [Rofecoxib] GI Intolerance   • Welchol [Colesevelam Hcl] Other (See Comments)     Joint pain   • Sulfamethoxazole-Trimethoprim Rash      Past Medical History:   Diagnosis Date   • Atherosclerosis of abdominal aorta (CMS/HCC)    • Atrophic vaginitis    • B12 deficiency    • Rivera's esophagus    • Cardiovascular disease    • Chronic back pain     Chronic back pain with intermittent epidural injections.   • Chronic low back pain    • Coronary artery disease    • Fibromyalgia    • Fibromyalgia    • GERD (gastroesophageal reflux disease)    • H/O mammogram 12/2014   • Hammer toe    • Hiatal hernia    • Hyperlipidemia    • Hypertension    • Lower extremity pain     Lower extremity discomfort: Right ROXANA 1.1, left ROXANA 1.1.   • Normocytic anemia due to blood loss    • Obstructive sleep apnea    • Obstructive sleep apnea treated with continuous positive airway pressure (CPAP)     Obstructive sleep apnea with CPAP compliance.   • Osteoarthritis     severe   • Palpitations     Event monitor, December 2011: Revealed brief episodes of atrial tachycardia.    • Senile osteoporosis      Past Surgical History:   Procedure Laterality Date   • BLADDER REPAIR     • CHOLECYSTECTOMY      Status post cholecystectomy.   • COLONOSCOPY  01/2015   • CORONARY STENT PLACEMENT  05/2015    2 stents 80% RCA 12/2007, LHC 2/2011. no critical disease   • HYSTERECTOMY      partial   • LUMBAR FUSION  2006    L4-s1   • OTHER SURGICAL HISTORY      [facility]   • VAGINAL REPAIR       Family History   Problem Relation Age of Onset   • No Known Problems Sister    • Heart disease Sister    • Heart attack Sister 54   • Coronary artery disease Sister    • Hypertension Mother       Social History     Socioeconomic  "History   • Marital status:      Spouse name: Not on file   • Number of children: Not on file   • Years of education: Not on file   • Highest education level: Not on file   Social Needs   • Financial resource strain: Not on file   • Food insecurity - worry: Not on file   • Food insecurity - inability: Not on file   • Transportation needs - medical: Not on file   • Transportation needs - non-medical: Not on file   Occupational History   • Not on file   Tobacco Use   • Smoking status: Never Smoker   • Smokeless tobacco: Never Used   Substance and Sexual Activity   • Alcohol use: No   • Drug use: No   • Sexual activity: No   Other Topics Concern   • Not on file   Social History Narrative   • Not on file        Review of Systems   Constitutional: Negative.    HENT: Negative.    Eyes: Negative.    Respiratory: Negative.    Cardiovascular: Negative.    Gastrointestinal: Negative.    Endocrine: Negative.    Genitourinary: Negative.    Musculoskeletal: Positive for joint swelling.   Skin: Negative.    Allergic/Immunologic: Negative.    Neurological: Negative.    Hematological: Negative.    Psychiatric/Behavioral: Negative.        The following portions of the patient's history were reviewed and updated as appropriate: allergies, current medications, past family history, past medical history, past social history, past surgical history and problem list.      Objective      Physical Exam  Pulse 74   Ht 157.5 cm (62.01\")   Wt 65 kg (143 lb 4.8 oz)   LMP  (LMP Unknown)   SpO2 99%   BMI 26.20 kg/m²     Body mass index is 26.2 kg/m².    Patient is well nourished and well developed.  Alert and oriented x 3.    Ortho Exam      Left Knee Exam  ----------  Knee Exam:  ----------  ALIGNMENT:  Left: neutral  ----------  RANGE OF MOTION:  Left: Normal (0-120 degrees) with no extensor lag or flexion contracture  LIGAMENTOUS STABILITY:   Left:stable to varus and valgus stress at terminal extension and 30 degrees without any " evidence of laxity   ----------  STRENGTH:  KNEE FLEXION  Left 5/5  KNEE EXTENSION Left 5/5  ----------  PAIN WITH PALPATION: Left medial joint line  PAIN WITH KNEE ROM:  Left no  PATELLAR CREPITUS:  Left yes  ----------  SENSATION TO LIGHT TOUCH:  DEEP PERONEAL/SUPERFICIAL PERONEAL/SURAL/SAPHENOUS/TIBIAL:   Left intact  ----------  EFFUSION:   Left:  no  ERYTHEMA:  ; Left:  no  WOUNDS/INCISIONS: none, no overlying skin problems.      Medical Decision Making    Data Review:   ordered and reviewed x-rays today    Assessment:  1. Primary osteoarthritis of left knee        Plan:  End-stage left knee arthritis.  I reviewed x-rays and clinical findings the patient today.  On exam, she has medial joint line tenderness with crepitance and no evidence of ligament or meniscus meniscal pathology.  X-rays today show a compartment arthritis with bone-on-bone contact in the medial joint line.  Patient reports Visco supplementation gives her 60% relief for about 6 months.  She is not interested in replacement.  Plan today is to get injections preauthorized for the left knee.  She'll return to begin the series once approved.      Jessica Aguila MA  01/29/19  10:32 AM

## 2019-02-04 ENCOUNTER — OFFICE VISIT (OUTPATIENT)
Dept: INTERNAL MEDICINE | Facility: CLINIC | Age: 84
End: 2019-02-04

## 2019-02-04 VITALS
SYSTOLIC BLOOD PRESSURE: 102 MMHG | HEIGHT: 62 IN | DIASTOLIC BLOOD PRESSURE: 68 MMHG | HEART RATE: 78 BPM | OXYGEN SATURATION: 97 % | WEIGHT: 143 LBS | BODY MASS INDEX: 26.31 KG/M2 | TEMPERATURE: 97.9 F

## 2019-02-04 DIAGNOSIS — K22.70 BARRETT'S ESOPHAGUS WITHOUT DYSPLASIA: ICD-10-CM

## 2019-02-04 DIAGNOSIS — E78.5 HYPERLIPIDEMIA, UNSPECIFIED HYPERLIPIDEMIA TYPE: ICD-10-CM

## 2019-02-04 DIAGNOSIS — I25.10 MULTIPLE VESSEL CORONARY ARTERY DISEASE: ICD-10-CM

## 2019-02-04 DIAGNOSIS — I25.10 ASCVD (ARTERIOSCLEROTIC CARDIOVASCULAR DISEASE): ICD-10-CM

## 2019-02-04 DIAGNOSIS — Z12.31 ENCOUNTER FOR SCREENING MAMMOGRAM FOR MALIGNANT NEOPLASM OF BREAST: ICD-10-CM

## 2019-02-04 DIAGNOSIS — F41.8 ANXIETY WITH DEPRESSION: ICD-10-CM

## 2019-02-04 DIAGNOSIS — I10 ESSENTIAL HYPERTENSION: Primary | ICD-10-CM

## 2019-02-04 DIAGNOSIS — E53.8 B12 DEFICIENCY: ICD-10-CM

## 2019-02-04 DIAGNOSIS — G25.81 RESTLESS LEG: ICD-10-CM

## 2019-02-04 DIAGNOSIS — K21.00 GASTROESOPHAGEAL REFLUX DISEASE WITH ESOPHAGITIS: ICD-10-CM

## 2019-02-04 DIAGNOSIS — M47.26 OSTEOARTHRITIS OF SPINE WITH RADICULOPATHY, LUMBAR REGION: ICD-10-CM

## 2019-02-04 PROCEDURE — 99214 OFFICE O/P EST MOD 30 MIN: CPT | Performed by: FAMILY MEDICINE

## 2019-02-04 RX ORDER — BISOPROLOL FUMARATE AND HYDROCHLOROTHIAZIDE 5; 6.25 MG/1; MG/1
1 TABLET ORAL DAILY
Qty: 90 TABLET | Refills: 1 | Status: SHIPPED | OUTPATIENT
Start: 2019-02-04 | End: 2020-01-09 | Stop reason: SDUPTHER

## 2019-02-04 RX ORDER — PANTOPRAZOLE SODIUM 40 MG/1
40 TABLET, DELAYED RELEASE ORAL DAILY
Qty: 90 TABLET | Refills: 0 | Status: SHIPPED | OUTPATIENT
Start: 2019-02-04

## 2019-02-04 RX ORDER — CYANOCOBALAMIN 1000 UG/ML
1000 INJECTION, SOLUTION INTRAMUSCULAR; SUBCUTANEOUS
Qty: 6 ML | Refills: 2 | Status: SHIPPED | OUTPATIENT
Start: 2019-02-04

## 2019-02-04 RX ORDER — ROPINIROLE 0.25 MG/1
0.25 TABLET, FILM COATED ORAL NIGHTLY
Qty: 60 TABLET | Refills: 2 | Status: SHIPPED | OUTPATIENT
Start: 2019-02-04

## 2019-02-04 RX ORDER — CITALOPRAM 20 MG/1
20 TABLET ORAL DAILY
Qty: 30 TABLET | Refills: 3 | Status: SHIPPED | OUTPATIENT
Start: 2019-02-04 | End: 2020-04-14

## 2019-02-04 NOTE — PROGRESS NOTES
"Subjective   Gaviota Evans is a 83 y.o. female.     Chief Complaint   Patient presents with   • Hyperlipidemia     f/u   • Hypertension   • B12 deficiency       Visit Vitals  /68   Pulse 78   Temp 97.9 °F (36.6 °C)   Ht 157.5 cm (62.01\")   Wt 64.9 kg (143 lb)   LMP  (LMP Unknown)   SpO2 97%   BMI 26.15 kg/m²         Hypertension   This is a chronic problem. The current episode started more than 1 year ago. The problem is unchanged. The problem is controlled. Associated symptoms include malaise/fatigue. Pertinent negatives include no anxiety, blurred vision, chest pain, headaches, neck pain, orthopnea, palpitations, peripheral edema, PND, shortness of breath or sweats. There are no associated agents to hypertension. Risk factors for coronary artery disease include dyslipidemia, family history and post-menopausal state. Current antihypertension treatment includes beta blockers and diuretics. The current treatment provides significant improvement. Hypertensive end-organ damage includes CAD/MI. There is no history of angina, kidney disease, CVA, heart failure, left ventricular hypertrophy, PVD or retinopathy. There is no history of chronic renal disease, sleep apnea or a thyroid problem.   Hyperlipidemia   This is a chronic problem. The current episode started more than 1 year ago. The problem is controlled. Recent lipid tests were reviewed and are normal. She has no history of chronic renal disease, diabetes, liver disease, obesity or nephrotic syndrome. Factors aggravating her hyperlipidemia include thiazides. Associated symptoms include myalgias. Pertinent negatives include no chest pain, focal sensory loss, focal weakness, leg pain or shortness of breath. Current antihyperlipidemic treatment includes statins. The current treatment provides significant improvement of lipids. There are no compliance problems.  Risk factors for coronary artery disease include dyslipidemia, family history, hypertension and " post-menopausal.   Coronary Artery Disease   Presents for follow-up visit. Pertinent negatives include no chest pain, chest pressure, chest tightness, dizziness, leg swelling, muscle weakness, palpitations, shortness of breath or weight gain. Risk factors include hyperlipidemia. Risk factors do not include obesity. The symptoms have been stable. Compliance with diet is good. Compliance with exercise is good. Compliance with medications is good.      Pt had shingles left knee that have dried. Pt has had too much pain until recently to drive. Pt will have injection in the left knee soon.   Pt is getting tramadol  Pt had herbal salve that helped her shingle pain.     Pt needs to have her B12 checked and refilled.  Pt does not want to have knee replacement at this time.    Pt has appt with pain clinic on Wednesday.     UTI better on levaquin.     Pt is exercising regularly.   The following portions of the patient's history were reviewed and updated as appropriate: allergies, current medications, past family history, past medical history, past social history, past surgical history and problem list.    Past Medical History:   Diagnosis Date   • Atherosclerosis of abdominal aorta (CMS/HCC)    • Atrophic vaginitis    • B12 deficiency    • Rivera's esophagus    • Cardiovascular disease    • Chronic back pain     Chronic back pain with intermittent epidural injections.   • Chronic low back pain    • Coronary artery disease    • Fibromyalgia    • Fibromyalgia    • GERD (gastroesophageal reflux disease)    • H/O mammogram 12/2014   • Hammer toe    • Hiatal hernia    • Hyperlipidemia    • Hypertension    • Lower extremity pain     Lower extremity discomfort: Right ROXANA 1.1, left ROXANA 1.1.   • Normocytic anemia due to blood loss    • Obstructive sleep apnea    • Obstructive sleep apnea treated with continuous positive airway pressure (CPAP)     Obstructive sleep apnea with CPAP compliance.   • Osteoarthritis     severe   •  Palpitations     Event monitor, December 2011: Revealed brief episodes of atrial tachycardia.    • Senile osteoporosis       Past Surgical History:   Procedure Laterality Date   • BLADDER REPAIR     • CHOLECYSTECTOMY      Status post cholecystectomy.   • COLONOSCOPY  01/2015   • CORONARY STENT PLACEMENT  05/2015    2 stents 80% RCA 12/2007, LHC 2/2011. no critical disease   • HYSTERECTOMY      partial   • LUMBAR FUSION  2006    L4-s1   • OTHER SURGICAL HISTORY      [facility]   • VAGINAL REPAIR        Family History   Problem Relation Age of Onset   • No Known Problems Sister    • Heart disease Sister    • Heart attack Sister 54   • Coronary artery disease Sister    • Hypertension Mother       Social History     Socioeconomic History   • Marital status:      Spouse name: Not on file   • Number of children: Not on file   • Years of education: Not on file   • Highest education level: Not on file   Social Needs   • Financial resource strain: Not on file   • Food insecurity - worry: Not on file   • Food insecurity - inability: Not on file   • Transportation needs - medical: Not on file   • Transportation needs - non-medical: Not on file   Occupational History   • Not on file   Tobacco Use   • Smoking status: Never Smoker   • Smokeless tobacco: Never Used   Substance and Sexual Activity   • Alcohol use: No   • Drug use: No   • Sexual activity: No   Other Topics Concern   • Not on file   Social History Narrative   • Not on file      Allergies   Allergen Reactions   • Acyclovir Dizziness   • Statins    • Ambien [Zolpidem Tartrate] Other (See Comments)     amnestic   • Arthrotec [Diclofenac-Misoprostol] GI Intolerance   • Aspirin GI Intolerance   • Augmentin [Amoxicillin-Pot Clavulanate] Unknown (See Comments)     unknown   • Bextra [Valdecoxib] GI Intolerance   • Ciprofloxacin    • Codeine GI Intolerance   • Crestor [Rosuvastatin] Other (See Comments)     Rxn not known   • Darvon [Propoxyphene] GI Intolerance   • Effient  [Prasugrel] Other (See Comments)     Tongue swelling   • Gabapentin Delirium   • Lescol [Fluvastatin] Other (See Comments)     Rxn not known   • Lexapro [Escitalopram Oxalate] Nausea Only   • Lyrica [Pregabalin] GI Bleeding and GI Intolerance   • Macrobid [Nitrofurantoin] Other (See Comments)     Pain/cramping   • Percocet [Oxycodone-Acetaminophen] Mental Status Change     depression   • Plavix [Clopidogrel Bisulfate] Swelling     Tongue swelling   • Pravachol [Pravastatin Sodium] Other (See Comments)     Rxn not known   • Vioxx [Rofecoxib] GI Intolerance   • Welchol [Colesevelam Hcl] Other (See Comments)     Joint pain   • Sulfamethoxazole-Trimethoprim Rash       Review of Systems   Constitutional: Positive for malaise/fatigue. Negative for chills, diaphoresis, fatigue, fever and weight gain.   HENT: Negative.  Negative for ear pain, nosebleeds, postnasal drip, rhinorrhea, sinus pressure, sneezing and sore throat.    Eyes: Negative.  Negative for blurred vision, redness and itching.   Respiratory: Negative.  Negative for cough, chest tightness, shortness of breath and wheezing.    Cardiovascular: Negative.  Negative for chest pain, palpitations, orthopnea, leg swelling and PND.   Gastrointestinal: Negative.  Negative for abdominal pain, constipation, diarrhea, nausea and vomiting.   Endocrine: Negative.  Negative for cold intolerance and heat intolerance.   Genitourinary: Negative.  Negative for dysuria, frequency, hematuria and urgency.   Musculoskeletal: Positive for myalgias. Negative for arthralgias, back pain, muscle weakness and neck pain.   Skin: Negative.  Negative for color change and rash.   Allergic/Immunologic: Negative.  Negative for environmental allergies.   Neurological: Negative.  Negative for dizziness, focal weakness, syncope, light-headedness and headaches.   Hematological: Negative.  Negative for adenopathy. Does not bruise/bleed easily.   Psychiatric/Behavioral: Positive for sleep disturbance.  Negative for dysphoric mood. The patient is not nervous/anxious.        Objective   Physical Exam   Constitutional: She is oriented to person, place, and time. She appears well-developed.   HENT:   Head: Normocephalic.   Right Ear: External ear normal.   Left Ear: External ear normal.   Nose: Nose normal.   Eyes: Conjunctivae, EOM and lids are normal. Pupils are equal, round, and reactive to light.   Neck: Trachea normal and normal range of motion. Neck supple. Carotid bruit is not present. No thyroid mass and no thyromegaly present.   Cardiovascular: Normal rate and regular rhythm.   No murmur heard.  Pulmonary/Chest: Effort normal and breath sounds normal. No respiratory distress. She has no decreased breath sounds. She has no wheezes. She has no rhonchi. She has no rales. She exhibits no tenderness.   Abdominal: Soft. Bowel sounds are normal. There is no tenderness.   Musculoskeletal: Normal range of motion.        Thoracic back: She exhibits tenderness.        Lumbar back: She exhibits tenderness.   Neurological: She is alert and oriented to person, place, and time.   Skin: Skin is warm and dry.        Psychiatric: She has a normal mood and affect. Her behavior is normal.   Nursing note and vitals reviewed.      Assessment/Plan   Gaviota was seen today for hyperlipidemia, hypertension and b12 deficiency.    Diagnoses and all orders for this visit:    Essential hypertension  -     bisoprolol-hydrochlorothiazide (ZIAC) 5-6.25 MG per tablet; Take 1 tablet by mouth Daily.  -     Comprehensive Metabolic Panel  -     Lipid Panel  -     TSH  -     CBC & Differential    Encounter for screening mammogram for malignant neoplasm of breast  -     Mammo Screening Digital Tomosynthesis Bilateral With CAD; Future    Hyperlipidemia, unspecified hyperlipidemia type  -     Comprehensive Metabolic Panel  -     Lipid Panel    B12 deficiency  -     cyanocobalamin 1000 MCG/ML injection; Inject 1 mL into the appropriate muscle as  directed by prescriber Every 14 (Fourteen) Days.  -     Vitamin B12  -     Folate    Restless leg  -     rOPINIRole (REQUIP) 0.25 MG tablet; Take 1 tablet by mouth Every Night. Take 1 hour before bedtime.    Anxiety with depression  -     citalopram (CeleXA) 20 MG tablet; Take 1 tablet by mouth Daily.    Rivera's esophagus without dysplasia  -     pantoprazole (PROTONIX) 40 MG EC tablet; Take 1 tablet by mouth Daily.    Gastroesophageal reflux disease with esophagitis  -     pantoprazole (PROTONIX) 40 MG EC tablet; Take 1 tablet by mouth Daily.    ASCVD (arteriosclerotic cardiovascular disease)    Multiple vessel coronary artery disease    Osteoarthritis of spine with radiculopathy, lumbar region  -     diclofenac (FLECTOR) 1.3 % patch patch; Apply 1 patch topically to the appropriate area as directed 2 (Two) Times a Day.      Discussed Shingrix vaccine with pt,  that is currently available at the pharmacy.   Do not combine flector patch with diclofenac pills.              Current Outpatient Medications:   •  acetaminophen-codeine (TYLENOL #3) 300-30 MG per tablet, , Disp: , Rfl:   •  aspirin 81 MG EC tablet, Take 81 mg by mouth daily., Disp: , Rfl:   •  bisoprolol-hydrochlorothiazide (ZIAC) 5-6.25 MG per tablet, Take 1 tablet by mouth Daily., Disp: 90 tablet, Rfl: 1  •  citalopram (CeleXA) 20 MG tablet, Take 1 tablet by mouth Daily., Disp: 30 tablet, Rfl: 3  •  Coenzyme Q10 (CO Q 10 PO), Take  by mouth Daily., Disp: , Rfl:   •  cyanocobalamin 1000 MCG/ML injection, Inject 1 mL into the appropriate muscle as directed by prescriber Every 14 (Fourteen) Days., Disp: 6 mL, Rfl: 2  •  diclofenac (VOLTAREN) 75 MG EC tablet, TAKE ONE TABLET BY MOUTH TWICE A DAY AS NEEDED FOR PAIN, Disp: 60 tablet, Rfl: 0  •  isosorbide mononitrate (IMDUR) 30 MG 24 hr tablet, Take 1 tablet by mouth Daily., Disp: 90 tablet, Rfl: 0  •  lidocaine (LIDODERM) 5 %, , Disp: , Rfl:   •  nitroglycerin (NITROSTAT) 0.4 MG SL tablet, Place 1 tablet  under the tongue As Needed for Chest Pain. Prn chest pain, Disp: 25 tablet, Rfl: 5  •  pantoprazole (PROTONIX) 40 MG EC tablet, Take 1 tablet by mouth Daily., Disp: 90 tablet, Rfl: 0  •  prednisoLONE acetate (PRED FORTE) 1 % ophthalmic suspension, Administer 1 drop into the left eye 2 (two) times a week., Disp: , Rfl:   •  rOPINIRole (REQUIP) 0.25 MG tablet, Take 1 tablet by mouth Every Night. Take 1 hour before bedtime., Disp: 60 tablet, Rfl: 2  •  simethicone (PHAZYME) 125 MG chewable tablet, Chew 1 tablet Every 6 (Six) Hours As Needed for Flatulence., Disp: 60 tablet, Rfl: 1  •  traMADol (ULTRAM) 50 MG tablet, Take 1 tablet by mouth Every 6 (Six) Hours As Needed for Moderate Pain ., Disp: 60 tablet, Rfl: 0  •  TURMERIC PO, Take  by mouth Daily., Disp: , Rfl:   •  vitamin E 400 UNIT capsule, Take 400 Units by mouth Daily. 3 tablets daily, Disp: , Rfl:   •  diclofenac (FLECTOR) 1.3 % patch patch, Apply 1 patch topically to the appropriate area as directed 2 (Two) Times a Day., Disp: 60 patch, Rfl: 2  •  levoFLOXacin (LEVAQUIN) 250 MG tablet, , Disp: , Rfl:     Return in about 3 months (around 5/4/2019), or if symptoms worsen or fail to improve, for Medicare Wellness, Recheck.

## 2019-02-05 LAB
ALBUMIN SERPL-MCNC: 4.14 G/DL (ref 3.2–4.8)
ALBUMIN/GLOB SERPL: 1.7 G/DL (ref 1.5–2.5)
ALP SERPL-CCNC: 94 U/L (ref 25–100)
ALT SERPL-CCNC: 13 U/L (ref 7–40)
AST SERPL-CCNC: 20 U/L (ref 0–33)
BASOPHILS # BLD AUTO: 0.03 10*3/MM3 (ref 0–0.2)
BASOPHILS NFR BLD AUTO: 0.3 % (ref 0–1)
BILIRUB SERPL-MCNC: 0.2 MG/DL (ref 0.3–1.2)
BUN SERPL-MCNC: 18 MG/DL (ref 9–23)
BUN/CREAT SERPL: 15.8 (ref 7–25)
CALCIUM SERPL-MCNC: 9.2 MG/DL (ref 8.7–10.4)
CHLORIDE SERPL-SCNC: 99 MMOL/L (ref 99–109)
CHOLEST SERPL-MCNC: 188 MG/DL (ref 0–200)
CO2 SERPL-SCNC: 28 MMOL/L (ref 20–31)
CREAT SERPL-MCNC: 1.14 MG/DL (ref 0.6–1.3)
EOSINOPHIL # BLD AUTO: 0.38 10*3/MM3 (ref 0–0.3)
EOSINOPHIL NFR BLD AUTO: 3.9 % (ref 0–3)
ERYTHROCYTE [DISTWIDTH] IN BLOOD BY AUTOMATED COUNT: 16.2 % (ref 11.3–14.5)
FOLATE SERPL-MCNC: 6.1 NG/ML (ref 3.2–20)
GLOBULIN SER CALC-MCNC: 2.5 GM/DL
GLUCOSE SERPL-MCNC: 97 MG/DL (ref 70–100)
HCT VFR BLD AUTO: 39.5 % (ref 34.5–44)
HDLC SERPL-MCNC: 48 MG/DL (ref 40–60)
HGB BLD-MCNC: 12.7 G/DL (ref 11.5–15.5)
IMM GRANULOCYTES # BLD AUTO: 0.02 10*3/MM3 (ref 0–0.03)
IMM GRANULOCYTES NFR BLD AUTO: 0.2 % (ref 0–0.6)
LDLC SERPL CALC-MCNC: 114 MG/DL (ref 0–100)
LYMPHOCYTES # BLD AUTO: 2.74 10*3/MM3 (ref 0.6–4.8)
LYMPHOCYTES NFR BLD AUTO: 28.4 % (ref 24–44)
MCH RBC QN AUTO: 27.7 PG (ref 27–31)
MCHC RBC AUTO-ENTMCNC: 32.2 G/DL (ref 32–36)
MCV RBC AUTO: 86.1 FL (ref 80–99)
MONOCYTES # BLD AUTO: 0.99 10*3/MM3 (ref 0–1)
MONOCYTES NFR BLD AUTO: 10.3 % (ref 0–12)
NEUTROPHILS # BLD AUTO: 5.49 10*3/MM3 (ref 1.5–8.3)
NEUTROPHILS NFR BLD AUTO: 56.9 % (ref 41–71)
PLATELET # BLD AUTO: 342 10*3/MM3 (ref 150–450)
POTASSIUM SERPL-SCNC: 4.4 MMOL/L (ref 3.5–5.5)
PROT SERPL-MCNC: 6.6 G/DL (ref 5.7–8.2)
RBC # BLD AUTO: 4.59 10*6/MM3 (ref 3.89–5.14)
SODIUM SERPL-SCNC: 134 MMOL/L (ref 132–146)
TRIGL SERPL-MCNC: 132 MG/DL (ref 0–150)
TSH SERPL DL<=0.005 MIU/L-ACNC: 3.54 MIU/ML (ref 0.35–5.35)
VIT B12 SERPL-MCNC: 1537 PG/ML (ref 211–911)
VLDLC SERPL CALC-MCNC: 26.4 MG/DL
WBC # BLD AUTO: 9.65 10*3/MM3 (ref 3.5–10.8)

## 2019-02-06 RX ORDER — LIDOCAINE 50 MG/G
1 PATCH TOPICAL EVERY 24 HOURS
Qty: 30 EACH | Refills: 5 | Status: SHIPPED | OUTPATIENT
Start: 2019-02-06

## 2019-02-14 ENCOUNTER — CLINICAL SUPPORT (OUTPATIENT)
Dept: ORTHOPEDIC SURGERY | Facility: CLINIC | Age: 84
End: 2019-02-14

## 2019-02-14 DIAGNOSIS — M17.12 PRIMARY OSTEOARTHRITIS OF LEFT KNEE: Primary | ICD-10-CM

## 2019-02-14 PROCEDURE — 20610 DRAIN/INJ JOINT/BURSA W/O US: CPT | Performed by: PHYSICIAN ASSISTANT

## 2019-02-14 NOTE — PROGRESS NOTES

## 2019-02-18 NOTE — PROGRESS NOTES
Subjective     No chief complaint on file.      Gaviota Evans is a 83 y.o. female.     History of Present Illness   Patient presents today for the 1st injection of Orthovisc in the left knee.  She has bad previous series with good relief.  Allergies   Allergen Reactions   • Acyclovir Dizziness   • Statins    • Ambien [Zolpidem Tartrate] Other (See Comments)     amnestic   • Arthrotec [Diclofenac-Misoprostol] GI Intolerance   • Aspirin GI Intolerance   • Augmentin [Amoxicillin-Pot Clavulanate] Unknown (See Comments)     unknown   • Bextra [Valdecoxib] GI Intolerance   • Ciprofloxacin    • Codeine GI Intolerance   • Crestor [Rosuvastatin] Other (See Comments)     Rxn not known   • Darvon [Propoxyphene] GI Intolerance   • Effient [Prasugrel] Other (See Comments)     Tongue swelling   • Gabapentin Delirium   • Lescol [Fluvastatin] Other (See Comments)     Rxn not known   • Lexapro [Escitalopram Oxalate] Nausea Only   • Lyrica [Pregabalin] GI Bleeding and GI Intolerance   • Macrobid [Nitrofurantoin] Other (See Comments)     Pain/cramping   • Percocet [Oxycodone-Acetaminophen] Mental Status Change     depression   • Plavix [Clopidogrel Bisulfate] Swelling     Tongue swelling   • Pravachol [Pravastatin Sodium] Other (See Comments)     Rxn not known   • Vioxx [Rofecoxib] GI Intolerance   • Welchol [Colesevelam Hcl] Other (See Comments)     Joint pain   • Sulfamethoxazole-Trimethoprim Rash     Current Outpatient Medications on File Prior to Visit   Medication Sig Dispense Refill   • acetaminophen-codeine (TYLENOL #3) 300-30 MG per tablet      • aspirin 81 MG EC tablet Take 81 mg by mouth daily.     • bisoprolol-hydrochlorothiazide (ZIAC) 5-6.25 MG per tablet Take 1 tablet by mouth Daily. 90 tablet 1   • citalopram (CeleXA) 20 MG tablet Take 1 tablet by mouth Daily. 30 tablet 3   • Coenzyme Q10 (CO Q 10 PO) Take  by mouth Daily.     • cyanocobalamin 1000 MCG/ML injection Inject 1 mL into the appropriate muscle as directed  by prescriber Every 14 (Fourteen) Days. 6 mL 2   • diclofenac (VOLTAREN) 75 MG EC tablet TAKE ONE TABLET BY MOUTH TWICE A DAY AS NEEDED FOR PAIN 60 tablet 0   • isosorbide mononitrate (IMDUR) 30 MG 24 hr tablet Take 1 tablet by mouth Daily. 90 tablet 0   • levoFLOXacin (LEVAQUIN) 250 MG tablet      • lidocaine (LIDODERM) 5 % Place 1 patch on the skin as directed by provider Daily. Remove & Discard patch within 12 hours or as directed by MD 30 each 5   • nitroglycerin (NITROSTAT) 0.4 MG SL tablet Place 1 tablet under the tongue As Needed for Chest Pain. Prn chest pain 25 tablet 5   • pantoprazole (PROTONIX) 40 MG EC tablet Take 1 tablet by mouth Daily. 90 tablet 0   • prednisoLONE acetate (PRED FORTE) 1 % ophthalmic suspension Administer 1 drop into the left eye 2 (two) times a week.     • rOPINIRole (REQUIP) 0.25 MG tablet Take 1 tablet by mouth Every Night. Take 1 hour before bedtime. 60 tablet 2   • simethicone (PHAZYME) 125 MG chewable tablet Chew 1 tablet Every 6 (Six) Hours As Needed for Flatulence. 60 tablet 1   • traMADol (ULTRAM) 50 MG tablet Take 1 tablet by mouth Every 6 (Six) Hours As Needed for Moderate Pain . 60 tablet 0   • TURMERIC PO Take  by mouth Daily.     • vitamin E 400 UNIT capsule Take 400 Units by mouth Daily. 3 tablets daily       No current facility-administered medications on file prior to visit.      Social History     Socioeconomic History   • Marital status:      Spouse name: Not on file   • Number of children: Not on file   • Years of education: Not on file   • Highest education level: Not on file   Social Needs   • Financial resource strain: Not on file   • Food insecurity - worry: Not on file   • Food insecurity - inability: Not on file   • Transportation needs - medical: Not on file   • Transportation needs - non-medical: Not on file   Occupational History   • Not on file   Tobacco Use   • Smoking status: Never Smoker   • Smokeless tobacco: Never Used   Substance and Sexual  Activity   • Alcohol use: No   • Drug use: No   • Sexual activity: No   Other Topics Concern   • Not on file   Social History Narrative   • Not on file     Past Surgical History:   Procedure Laterality Date   • BLADDER REPAIR     • CHOLECYSTECTOMY      Status post cholecystectomy.   • COLONOSCOPY  01/2015   • CORONARY STENT PLACEMENT  05/2015    2 stents 80% RCA 12/2007, LHC 2/2011. no critical disease   • HYSTERECTOMY      partial   • LUMBAR FUSION  2006    L4-s1   • OTHER SURGICAL HISTORY      [facility]   • VAGINAL REPAIR       Family History   Problem Relation Age of Onset   • No Known Problems Sister    • Heart disease Sister    • Heart attack Sister 54   • Coronary artery disease Sister    • Hypertension Mother      Past Medical History:   Diagnosis Date   • Atherosclerosis of abdominal aorta (CMS/HCC)    • Atrophic vaginitis    • B12 deficiency    • Rivera's esophagus    • Cardiovascular disease    • Chronic back pain     Chronic back pain with intermittent epidural injections.   • Chronic low back pain    • Coronary artery disease    • Fibromyalgia    • Fibromyalgia    • GERD (gastroesophageal reflux disease)    • H/O mammogram 12/2014   • Hammer toe    • Hiatal hernia    • Hyperlipidemia    • Hypertension    • Lower extremity pain     Lower extremity discomfort: Right ROXANA 1.1, left ROXANA 1.1.   • Normocytic anemia due to blood loss    • Obstructive sleep apnea    • Obstructive sleep apnea treated with continuous positive airway pressure (CPAP)     Obstructive sleep apnea with CPAP compliance.   • Osteoarthritis     severe   • Palpitations     Event monitor, December 2011: Revealed brief episodes of atrial tachycardia.    • Senile osteoporosis          Review of Systems    The following portions of the patient's history were reviewed and updated as appropriate: allergies, current medications, past family history, past medical history, past social history, past surgical history and problem list.    Ortho  Exam      Medical Decision Making    Assessment and Plan/ Diagnosis/Treatment options:   Left knee arthritis here to begin an Orthovisc series.  Plan is to begin the series in both knees today.  She will return in one week for the 2nd injection or sooner if needed.

## 2019-02-21 ENCOUNTER — CLINICAL SUPPORT (OUTPATIENT)
Dept: ORTHOPEDIC SURGERY | Facility: CLINIC | Age: 84
End: 2019-02-21

## 2019-02-21 DIAGNOSIS — M17.12 PRIMARY OSTEOARTHRITIS OF LEFT KNEE: Primary | ICD-10-CM

## 2019-02-21 PROCEDURE — 20610 DRAIN/INJ JOINT/BURSA W/O US: CPT | Performed by: PHYSICIAN ASSISTANT

## 2019-02-21 NOTE — PROGRESS NOTES
Subjective     Injections of the Left Knee (orthovisc #2)      Gaviota Evans is a 83 y.o. female.     History of Present Illness   Patient presents today for the 2nd injection of Orthovisc in the left knee.  Allergies   Allergen Reactions   • Acyclovir Dizziness   • Statins    • Ambien [Zolpidem Tartrate] Other (See Comments)     amnestic   • Arthrotec [Diclofenac-Misoprostol] GI Intolerance   • Aspirin GI Intolerance   • Augmentin [Amoxicillin-Pot Clavulanate] Unknown (See Comments)     unknown   • Bextra [Valdecoxib] GI Intolerance   • Ciprofloxacin    • Codeine GI Intolerance   • Crestor [Rosuvastatin] Other (See Comments)     Rxn not known   • Darvon [Propoxyphene] GI Intolerance   • Effient [Prasugrel] Other (See Comments)     Tongue swelling   • Gabapentin Delirium   • Lescol [Fluvastatin] Other (See Comments)     Rxn not known   • Lexapro [Escitalopram Oxalate] Nausea Only   • Lyrica [Pregabalin] GI Bleeding and GI Intolerance   • Macrobid [Nitrofurantoin] Other (See Comments)     Pain/cramping   • Percocet [Oxycodone-Acetaminophen] Mental Status Change     depression   • Plavix [Clopidogrel Bisulfate] Swelling     Tongue swelling   • Pravachol [Pravastatin Sodium] Other (See Comments)     Rxn not known   • Vioxx [Rofecoxib] GI Intolerance   • Welchol [Colesevelam Hcl] Other (See Comments)     Joint pain   • Sulfamethoxazole-Trimethoprim Rash     Current Outpatient Medications on File Prior to Visit   Medication Sig Dispense Refill   • acetaminophen-codeine (TYLENOL #3) 300-30 MG per tablet      • aspirin 81 MG EC tablet Take 81 mg by mouth daily.     • bisoprolol-hydrochlorothiazide (ZIAC) 5-6.25 MG per tablet Take 1 tablet by mouth Daily. 90 tablet 1   • citalopram (CeleXA) 20 MG tablet Take 1 tablet by mouth Daily. 30 tablet 3   • Coenzyme Q10 (CO Q 10 PO) Take  by mouth Daily.     • cyanocobalamin 1000 MCG/ML injection Inject 1 mL into the appropriate muscle as directed by prescriber Every 14 (Fourteen)  Days. 6 mL 2   • diclofenac (VOLTAREN) 75 MG EC tablet TAKE ONE TABLET BY MOUTH TWICE A DAY AS NEEDED FOR PAIN 60 tablet 0   • isosorbide mononitrate (IMDUR) 30 MG 24 hr tablet Take 1 tablet by mouth Daily. 90 tablet 0   • levoFLOXacin (LEVAQUIN) 250 MG tablet      • lidocaine (LIDODERM) 5 % Place 1 patch on the skin as directed by provider Daily. Remove & Discard patch within 12 hours or as directed by MD 30 each 5   • nitroglycerin (NITROSTAT) 0.4 MG SL tablet Place 1 tablet under the tongue As Needed for Chest Pain. Prn chest pain 25 tablet 5   • pantoprazole (PROTONIX) 40 MG EC tablet Take 1 tablet by mouth Daily. 90 tablet 0   • prednisoLONE acetate (PRED FORTE) 1 % ophthalmic suspension Administer 1 drop into the left eye 2 (two) times a week.     • rOPINIRole (REQUIP) 0.25 MG tablet Take 1 tablet by mouth Every Night. Take 1 hour before bedtime. 60 tablet 2   • simethicone (PHAZYME) 125 MG chewable tablet Chew 1 tablet Every 6 (Six) Hours As Needed for Flatulence. 60 tablet 1   • traMADol (ULTRAM) 50 MG tablet Take 1 tablet by mouth Every 6 (Six) Hours As Needed for Moderate Pain . 60 tablet 0   • TURMERIC PO Take  by mouth Daily.     • vitamin E 400 UNIT capsule Take 400 Units by mouth Daily. 3 tablets daily       No current facility-administered medications on file prior to visit.      Social History     Socioeconomic History   • Marital status:      Spouse name: Not on file   • Number of children: Not on file   • Years of education: Not on file   • Highest education level: Not on file   Social Needs   • Financial resource strain: Not on file   • Food insecurity - worry: Not on file   • Food insecurity - inability: Not on file   • Transportation needs - medical: Not on file   • Transportation needs - non-medical: Not on file   Occupational History   • Not on file   Tobacco Use   • Smoking status: Never Smoker   • Smokeless tobacco: Never Used   Substance and Sexual Activity   • Alcohol use: No   •  Drug use: No   • Sexual activity: No   Other Topics Concern   • Not on file   Social History Narrative   • Not on file     Past Surgical History:   Procedure Laterality Date   • BLADDER REPAIR     • CHOLECYSTECTOMY      Status post cholecystectomy.   • COLONOSCOPY  01/2015   • CORONARY STENT PLACEMENT  05/2015    2 stents 80% RCA 12/2007, LHC 2/2011. no critical disease   • HYSTERECTOMY      partial   • LUMBAR FUSION  2006    L4-s1   • OTHER SURGICAL HISTORY      [facility]   • VAGINAL REPAIR       Family History   Problem Relation Age of Onset   • No Known Problems Sister    • Heart disease Sister    • Heart attack Sister 54   • Coronary artery disease Sister    • Hypertension Mother      Past Medical History:   Diagnosis Date   • Atherosclerosis of abdominal aorta (CMS/HCC)    • Atrophic vaginitis    • B12 deficiency    • Rivera's esophagus    • Cardiovascular disease    • Chronic back pain     Chronic back pain with intermittent epidural injections.   • Chronic low back pain    • Coronary artery disease    • Fibromyalgia    • Fibromyalgia    • GERD (gastroesophageal reflux disease)    • H/O mammogram 12/2014   • Hammer toe    • Hiatal hernia    • Hyperlipidemia    • Hypertension    • Lower extremity pain     Lower extremity discomfort: Right ROXANA 1.1, left ROXANA 1.1.   • Normocytic anemia due to blood loss    • Obstructive sleep apnea    • Obstructive sleep apnea treated with continuous positive airway pressure (CPAP)     Obstructive sleep apnea with CPAP compliance.   • Osteoarthritis     severe   • Palpitations     Event monitor, December 2011: Revealed brief episodes of atrial tachycardia.    • Senile osteoporosis          Review of Systems    The following portions of the patient's history were reviewed and updated as appropriate: allergies, current medications, past family history, past medical history, past social history, past surgical history and problem list.    Ortho Exam      Medical Decision  Making    Assessment and Plan/ Diagnosis/Treatment options:   Left knee arthritis undergoing an Orthovisc series.  Plan is to proceed with the 2nd injection in the left knee.  She will return in one week for the final injection or sooner if needed.    Using sterile technique, the left knee was sterilely prepped with Hibiclens.  Following time out, using a 22 gauge needle the left knee was aspirated and then injected with 2 ml Orthovisc. Approximately 0.5 mm of straw-colored fluid was obtained.  Patient tolerated the procedure well.  No complications.

## 2019-02-21 NOTE — PROGRESS NOTES

## 2019-03-07 ENCOUNTER — HOSPITAL ENCOUNTER (OUTPATIENT)
Dept: GENERAL RADIOLOGY | Facility: HOSPITAL | Age: 84
Discharge: HOME | End: 2019-03-07

## 2019-03-07 ENCOUNTER — HOSPITAL ENCOUNTER (OUTPATIENT)
Dept: GENERAL RADIOLOGY | Facility: HOSPITAL | Age: 84
Discharge: HOME | End: 2019-03-07
Admitting: PAIN MEDICINE

## 2019-03-07 ENCOUNTER — TRANSCRIBE ORDERS (OUTPATIENT)
Dept: ADMINISTRATIVE | Facility: HOSPITAL | Age: 84
End: 2019-03-07

## 2019-03-07 ENCOUNTER — CLINICAL SUPPORT (OUTPATIENT)
Dept: ORTHOPEDIC SURGERY | Facility: CLINIC | Age: 84
End: 2019-03-07

## 2019-03-07 DIAGNOSIS — M54.2 NECK PAIN: Primary | ICD-10-CM

## 2019-03-07 DIAGNOSIS — M17.12 PRIMARY OSTEOARTHRITIS OF LEFT KNEE: Primary | ICD-10-CM

## 2019-03-07 DIAGNOSIS — M96.1 LUMBAR POST-LAMINECTOMY SYNDROME: ICD-10-CM

## 2019-03-07 DIAGNOSIS — M54.2 NECK PAIN: ICD-10-CM

## 2019-03-07 PROCEDURE — 72040 X-RAY EXAM NECK SPINE 2-3 VW: CPT

## 2019-03-07 PROCEDURE — 20610 DRAIN/INJ JOINT/BURSA W/O US: CPT | Performed by: PHYSICIAN ASSISTANT

## 2019-03-07 PROCEDURE — 72110 X-RAY EXAM L-2 SPINE 4/>VWS: CPT

## 2019-03-07 NOTE — PROGRESS NOTES

## 2019-03-07 NOTE — PROGRESS NOTES
Subjective     Follow-up (1 week - #3 Left Orthovisc knee injection - Primary osteoarthritis of Left knee)      Gaviota Evans is a 83 y.o. female.     History of Present Illness   Patient presents for the final injection of Orthovisc in the left knee.  She has tolerated previous injections well.  No new symptoms.  Allergies   Allergen Reactions   • Acyclovir Dizziness   • Statins    • Ambien [Zolpidem Tartrate] Other (See Comments)     amnestic   • Arthrotec [Diclofenac-Misoprostol] GI Intolerance   • Aspirin GI Intolerance   • Augmentin [Amoxicillin-Pot Clavulanate] Unknown (See Comments)     unknown   • Bextra [Valdecoxib] GI Intolerance   • Ciprofloxacin    • Codeine GI Intolerance   • Crestor [Rosuvastatin] Other (See Comments)     Rxn not known   • Darvon [Propoxyphene] GI Intolerance   • Effient [Prasugrel] Other (See Comments)     Tongue swelling   • Gabapentin Delirium   • Lescol [Fluvastatin] Other (See Comments)     Rxn not known   • Lexapro [Escitalopram Oxalate] Nausea Only   • Lyrica [Pregabalin] GI Bleeding and GI Intolerance   • Macrobid [Nitrofurantoin] Other (See Comments)     Pain/cramping   • Percocet [Oxycodone-Acetaminophen] Mental Status Change     depression   • Plavix [Clopidogrel Bisulfate] Swelling     Tongue swelling   • Pravachol [Pravastatin Sodium] Other (See Comments)     Rxn not known   • Vioxx [Rofecoxib] GI Intolerance   • Welchol [Colesevelam Hcl] Other (See Comments)     Joint pain   • Sulfamethoxazole-Trimethoprim Rash     Current Outpatient Medications on File Prior to Visit   Medication Sig Dispense Refill   • acetaminophen-codeine (TYLENOL #3) 300-30 MG per tablet      • aspirin 81 MG EC tablet Take 81 mg by mouth daily.     • bisoprolol-hydrochlorothiazide (ZIAC) 5-6.25 MG per tablet Take 1 tablet by mouth Daily. 90 tablet 1   • citalopram (CeleXA) 20 MG tablet Take 1 tablet by mouth Daily. 30 tablet 3   • Coenzyme Q10 (CO Q 10 PO) Take  by mouth Daily.     • cyanocobalamin  1000 MCG/ML injection Inject 1 mL into the appropriate muscle as directed by prescriber Every 14 (Fourteen) Days. 6 mL 2   • diclofenac (VOLTAREN) 75 MG EC tablet TAKE ONE TABLET BY MOUTH TWICE A DAY AS NEEDED FOR PAIN 60 tablet 0   • isosorbide mononitrate (IMDUR) 30 MG 24 hr tablet Take 1 tablet by mouth Daily. 90 tablet 0   • levoFLOXacin (LEVAQUIN) 250 MG tablet      • lidocaine (LIDODERM) 5 % Place 1 patch on the skin as directed by provider Daily. Remove & Discard patch within 12 hours or as directed by MD 30 each 5   • nitroglycerin (NITROSTAT) 0.4 MG SL tablet Place 1 tablet under the tongue As Needed for Chest Pain. Prn chest pain 25 tablet 5   • pantoprazole (PROTONIX) 40 MG EC tablet Take 1 tablet by mouth Daily. 90 tablet 0   • prednisoLONE acetate (PRED FORTE) 1 % ophthalmic suspension Administer 1 drop into the left eye 2 (two) times a week.     • rOPINIRole (REQUIP) 0.25 MG tablet Take 1 tablet by mouth Every Night. Take 1 hour before bedtime. 60 tablet 2   • simethicone (PHAZYME) 125 MG chewable tablet Chew 1 tablet Every 6 (Six) Hours As Needed for Flatulence. 60 tablet 1   • traMADol (ULTRAM) 50 MG tablet Take 1 tablet by mouth Every 6 (Six) Hours As Needed for Moderate Pain . 60 tablet 0   • TURMERIC PO Take  by mouth Daily.     • vitamin E 400 UNIT capsule Take 400 Units by mouth Daily. 3 tablets daily       No current facility-administered medications on file prior to visit.      Social History     Socioeconomic History   • Marital status:      Spouse name: Not on file   • Number of children: Not on file   • Years of education: Not on file   • Highest education level: Not on file   Social Needs   • Financial resource strain: Not on file   • Food insecurity - worry: Not on file   • Food insecurity - inability: Not on file   • Transportation needs - medical: Not on file   • Transportation needs - non-medical: Not on file   Occupational History   • Not on file   Tobacco Use   • Smoking  status: Never Smoker   • Smokeless tobacco: Never Used   Substance and Sexual Activity   • Alcohol use: No   • Drug use: No   • Sexual activity: No   Other Topics Concern   • Not on file   Social History Narrative   • Not on file     Past Surgical History:   Procedure Laterality Date   • BLADDER REPAIR     • CHOLECYSTECTOMY      Status post cholecystectomy.   • COLONOSCOPY  01/2015   • CORONARY STENT PLACEMENT  05/2015    2 stents 80% RCA 12/2007, LHC 2/2011. no critical disease   • HYSTERECTOMY      partial   • LUMBAR FUSION  2006    L4-s1   • OTHER SURGICAL HISTORY      [facility]   • VAGINAL REPAIR       Family History   Problem Relation Age of Onset   • No Known Problems Sister    • Heart disease Sister    • Heart attack Sister 54   • Coronary artery disease Sister    • Hypertension Mother      Past Medical History:   Diagnosis Date   • Atherosclerosis of abdominal aorta (CMS/HCC)    • Atrophic vaginitis    • B12 deficiency    • Rivera's esophagus    • Cardiovascular disease    • Chronic back pain     Chronic back pain with intermittent epidural injections.   • Chronic low back pain    • Coronary artery disease    • Fibromyalgia    • Fibromyalgia    • GERD (gastroesophageal reflux disease)    • H/O mammogram 12/2014   • Hammer toe    • Hiatal hernia    • Hyperlipidemia    • Hypertension    • Lower extremity pain     Lower extremity discomfort: Right ROXANA 1.1, left ROXANA 1.1.   • Normocytic anemia due to blood loss    • Obstructive sleep apnea    • Obstructive sleep apnea treated with continuous positive airway pressure (CPAP)     Obstructive sleep apnea with CPAP compliance.   • Osteoarthritis     severe   • Palpitations     Event monitor, December 2011: Revealed brief episodes of atrial tachycardia.    • Senile osteoporosis          Review of Systems    The following portions of the patient's history were reviewed and updated as appropriate: allergies, current medications, past family history, past medical history, past  social history, past surgical history and problem list.    Ortho Exam      Medical Decision Making    Assessment and Plan/ Diagnosis/Treatment options:   Left knee arthritis undergoing an orthovisc series.  Plan is to finish the series in the left knee today.  She may consider knee replacement in the near future.  I will have her return to see Dr. Powell to further discuss her options.    Using sterile technique, the left knee was sterilely prepped with Hibiclens.  Following time out, using a 22 gauge needle the left knee was aspirated and then injected with 2 ml Orthovisc. Approximately 0.5 mm of straw-colored fluid was obtained.  Patient tolerated the procedure well.  No complications.

## 2019-04-04 ENCOUNTER — TELEPHONE (OUTPATIENT)
Dept: INTERNAL MEDICINE | Facility: CLINIC | Age: 84
End: 2019-04-04

## 2019-04-04 NOTE — TELEPHONE ENCOUNTER
Patient has jury duty and is wanting relieved from that.  She is requesting a letter to be faxed to [phone].  Patients PH is [phone]

## 2019-04-04 NOTE — TELEPHONE ENCOUNTER
"Pt states she has fibromyalgia, Per Dr. Sorto fibromyalgia is not substantial for not going to jury duty. Pt was notified of this and she states\" she can't get started in the morning and really didn't feel like going.\" Advised we could write the letter for fibromyalgia but the court was discard and ask her to be present any way. She states that she would just have her pain doctor write it for her then she hung up on me.   "

## 2019-04-26 ENCOUNTER — APPOINTMENT (OUTPATIENT)
Dept: MAMMOGRAPHY | Facility: HOSPITAL | Age: 84
End: 2019-04-26

## 2019-05-10 ENCOUNTER — HOSPITAL ENCOUNTER (OUTPATIENT)
Dept: GENERAL RADIOLOGY | Facility: HOSPITAL | Age: 84
Discharge: HOME | End: 2019-05-10
Admitting: PAIN MEDICINE

## 2019-05-10 ENCOUNTER — TRANSCRIBE ORDERS (OUTPATIENT)
Dept: ADMINISTRATIVE | Facility: HOSPITAL | Age: 84
End: 2019-05-10

## 2019-05-10 DIAGNOSIS — R52 PAIN: Primary | ICD-10-CM

## 2019-05-10 PROCEDURE — 73502 X-RAY EXAM HIP UNI 2-3 VIEWS: CPT

## 2019-06-10 DIAGNOSIS — I25.10 ASCVD (ARTERIOSCLEROTIC CARDIOVASCULAR DISEASE): ICD-10-CM

## 2019-06-10 RX ORDER — ISOSORBIDE MONONITRATE 30 MG/1
30 TABLET, EXTENDED RELEASE ORAL DAILY
Qty: 90 TABLET | Refills: 0 | Status: SHIPPED | OUTPATIENT
Start: 2019-06-10 | End: 2019-08-14 | Stop reason: SDUPTHER

## 2019-08-14 ENCOUNTER — OFFICE VISIT (OUTPATIENT)
Dept: CARDIOLOGY | Facility: CLINIC | Age: 84
End: 2019-08-14

## 2019-08-14 VITALS
HEIGHT: 62 IN | BODY MASS INDEX: 26.5 KG/M2 | OXYGEN SATURATION: 92 % | WEIGHT: 144 LBS | SYSTOLIC BLOOD PRESSURE: 110 MMHG | DIASTOLIC BLOOD PRESSURE: 68 MMHG | HEART RATE: 77 BPM

## 2019-08-14 DIAGNOSIS — I10 ESSENTIAL HYPERTENSION: ICD-10-CM

## 2019-08-14 DIAGNOSIS — I25.118 CORONARY ARTERY DISEASE OF NATIVE ARTERY OF NATIVE HEART WITH STABLE ANGINA PECTORIS (HCC): Primary | ICD-10-CM

## 2019-08-14 DIAGNOSIS — E78.5 HYPERLIPIDEMIA, UNSPECIFIED HYPERLIPIDEMIA TYPE: ICD-10-CM

## 2019-08-14 DIAGNOSIS — I25.10 ASCVD (ARTERIOSCLEROTIC CARDIOVASCULAR DISEASE): ICD-10-CM

## 2019-08-14 PROCEDURE — 99214 OFFICE O/P EST MOD 30 MIN: CPT | Performed by: INTERNAL MEDICINE

## 2019-08-14 RX ORDER — ISOSORBIDE MONONITRATE 60 MG/1
60 TABLET, EXTENDED RELEASE ORAL DAILY
Qty: 90 TABLET | Refills: 3 | Status: SHIPPED | OUTPATIENT
Start: 2019-08-14 | End: 2020-01-09 | Stop reason: SDUPTHER

## 2019-08-14 NOTE — PROGRESS NOTES
Gaviota Evans  1935  83 y.o.  [phone]  [phone]      Date: 08/14/2019    PCP: Angela Sorto MD    Chief Complaint   Patient presents with   • Coronary Artery Disease       Problem List:  1. Coronary artery disease:  a. [facility], 12/10/2007, Dr. Grewal: S/p 2.5 x 8 mm Micro  stent, 2.5 x 14 mm Micro  stent, and 2.5 x 15 mm Micro  stent placement to the LAD and OM1.    b. [facility], 01/04/2008, Dr. Manley: S/p 3.5 x 16 mm Liberté BMS to RCA.  c. [facility] Cardiolite 06/2010 Dr. Harris: Patient did not reach target heart rate secondary  to not holding beta blocker, negative for ischemia.  d. [facility], 03/01/2011, Dr. Goodwin: EF 55-60%. 3/3 patent stents. No territories appear to be a cause of ischemia. Zebeta changed to Diltiazem 120  mg per day.  e. Echocardiogram, 03/01/2011: Mild AI, trace MR, mild TR, EF 55-60%.  f. [facility], 05/12/2015, Dr. Cooney: 90% and 60% sequential stenosis in the OM reduced to 0 with a 2.5  x 23 mm and a 2.5 x 8 mm Xience Alpine DENISE. Non-flow-limiting disease of the LAD and diagonal branch (40%), 50% RCA, normal LVEF.  2. Palpitations:  a.  Event monitor, December 2011: Revealed brief episodes of atrial tachycardia.   3. Hypertension.  4. Hyperlipidemia.  5. BUBBA with CPAP compliance.  6. Fibromyalgia.  7. Chronic back pain with intermittent epidural injections.  8. Lower extremity edema/discomfort:  a. Remote ROXANA:  right ROXANA 1.1, left ROXANA 1.1.  b. Bilateral lower extremity duplex, 3/27/2017: No significant right lower extremity atherosclerotic arterial disease. Mild left lower extremity atherosclerotic arterial disease.   9. Surgical history:  a. Cholecystectomy.  b. Vaginal repair.  c. Lumbar fusion, 2006.  d. Hysterectomy.    Allergies   Allergen Reactions   • Acyclovir Dizziness   • Statins    • Ambien [Zolpidem Tartrate] Other (See Comments)     amnestic   • Arthrotec [Diclofenac-Misoprostol] GI Intolerance   • Aspirin GI Intolerance   • Augmentin  [Amoxicillin-Pot Clavulanate] Unknown (See Comments)     unknown   • Bextra [Valdecoxib] GI Intolerance   • Ciprofloxacin    • Codeine GI Intolerance   • Crestor [Rosuvastatin] Other (See Comments)     Rxn not known   • Darvon [Propoxyphene] GI Intolerance   • Effient [Prasugrel] Other (See Comments)     Tongue swelling   • Gabapentin Delirium   • Lescol [Fluvastatin] Other (See Comments)     Rxn not known   • Lexapro [Escitalopram Oxalate] Nausea Only   • Lyrica [Pregabalin] GI Bleeding and GI Intolerance   • Macrobid [Nitrofurantoin] Other (See Comments)     Pain/cramping   • Percocet [Oxycodone-Acetaminophen] Mental Status Change     depression   • Plavix [Clopidogrel Bisulfate] Swelling     Tongue swelling   • Pravachol [Pravastatin Sodium] Other (See Comments)     Rxn not known   • Vioxx [Rofecoxib] GI Intolerance   • Welchol [Colesevelam Hcl] Other (See Comments)     Joint pain   • Sulfamethoxazole-Trimethoprim Rash       Current Medications:      Current Outpatient Medications:   •  aspirin 81 MG EC tablet, Take 81 mg by mouth daily., Disp: , Rfl:   •  bisoprolol-hydrochlorothiazide (ZIAC) 5-6.25 MG per tablet, Take 1 tablet by mouth Daily., Disp: 90 tablet, Rfl: 1  •  citalopram (CeleXA) 20 MG tablet, Take 1 tablet by mouth Daily., Disp: 30 tablet, Rfl: 3  •  Coenzyme Q10 (CO Q 10 PO), Take  by mouth Daily., Disp: , Rfl:   •  cyanocobalamin 1000 MCG/ML injection, Inject 1 mL into the appropriate muscle as directed by prescriber Every 14 (Fourteen) Days., Disp: 6 mL, Rfl: 2  •  isosorbide mononitrate (IMDUR) 30 MG 24 hr tablet, Take 1 tablet by mouth Daily., Disp: 90 tablet, Rfl: 0  •  levoFLOXacin (LEVAQUIN) 250 MG tablet, , Disp: , Rfl:   •  lidocaine (LIDODERM) 5 %, Place 1 patch on the skin as directed by provider Daily. Remove & Discard patch within 12 hours or as directed by MD, Disp: 30 each, Rfl: 5  •  nitroglycerin (NITROSTAT) 0.4 MG SL tablet, Place 1 tablet under the tongue As Needed for Chest  "Pain. Prn chest pain, Disp: 25 tablet, Rfl: 5  •  pantoprazole (PROTONIX) 40 MG EC tablet, Take 1 tablet by mouth Daily., Disp: 90 tablet, Rfl: 0  •  prednisoLONE acetate (PRED FORTE) 1 % ophthalmic suspension, Administer 1 drop into the left eye 2 (two) times a week., Disp: , Rfl:   •  rOPINIRole (REQUIP) 0.25 MG tablet, Take 1 tablet by mouth Every Night. Take 1 hour before bedtime., Disp: 60 tablet, Rfl: 2  •  simethicone (PHAZYME) 125 MG chewable tablet, Chew 1 tablet Every 6 (Six) Hours As Needed for Flatulence., Disp: 60 tablet, Rfl: 1  •  traMADol (ULTRAM) 50 MG tablet, Take 1 tablet by mouth Every 6 (Six) Hours As Needed for Moderate Pain ., Disp: 60 tablet, Rfl: 0  •  TURMERIC PO, Take  by mouth Daily., Disp: , Rfl:   •  vitamin E 400 UNIT capsule, Take 400 Units by mouth Daily. 3 tablets daily, Disp: , Rfl:     HPI    Gaviota Evans is a 83 y.o. female who presents today for follow up of coronary artery disease, hypertension, and hyperlipidemia. Since last visit, she has been experiencing some fatigue, especially after she eats. She occasionally experiences associated  pain in her shoulders. Fatigue, shortness of breath, and shoulder pain were the symptoms she experienced prior to PCI. She feels that her current symptoms are similar to those she experienced in 2014, though less severe. Patient denies palpitations, edema, dizziness, and syncope. Outside of cardiology, she complains of knee pain, for which she would like to have a new round of injections.    The following portions of the patient's history were reviewed and updated as appropriate: allergies, current medications and problem list.    Pertinent positives as listed in the HPI.  All other systems reviewed are negative.    Vitals:    08/14/19 1558   BP: 110/68   BP Location: Right arm   Patient Position: Sitting   Pulse: 77   SpO2: 92%   Weight: 65.3 kg (144 lb)   Height: 157.5 cm (62\")       Physical Exam:    General: Alert and oriented.  Neck: " Jugular venous pressure is within normal limits. Carotids have normal upstrokes without bruits.   Cardiovascular: Heart has a nondisplaced focal PMI. Regular rate and rhythm without murmur, gallop or rub.  Lungs: Clear without rales or wheezes. Equal expansion is noted.   Extremities: Show no edema.  Skin: Warm and dry.  Neurologic: Nonfocal.    Diagnostic Data:  Lab Results   Component Value Date    GLUCOSE 77 08/22/2016    BUN 18 02/04/2019    CREATININE 1.14 02/04/2019    EGFRIFNONA 46 (L) 02/04/2019    EGFRIFAFRI 55 (L) 02/04/2019    BCR 15.8 02/04/2019     02/04/2019    K 4.4 02/04/2019    CL 99 02/04/2019    CO2 28.0 02/04/2019    CALCIUM 9.2 02/04/2019    PROTENTOTREF 6.6 02/04/2019    ALBUMIN 4.14 02/04/2019    LABIL2 1.7 02/04/2019    AST 20 02/04/2019    ALT 13 02/04/2019     Lab Results   Component Value Date    CHLPL 188 02/04/2019    TRIG 132 02/04/2019    HDL 48 02/04/2019     (H) 02/04/2019      Lab Results   Component Value Date    WBC 9.65 02/04/2019    HGB 12.7 02/04/2019    HCT 39.5 02/04/2019    MCV 86.1 02/04/2019     02/04/2019     Lab Results   Component Value Date    TSH 3.537 02/04/2019       Procedures    Assessment:      ICD-10-CM ICD-9-CM   1. Coronary artery disease of native artery of native heart with stable angina pectoris (CMS/[facility]) I25.118 414.01     413.9   2. Essential hypertension I10 401.9   3. Hyperlipidemia, unspecified hyperlipidemia type E78.5 272.4     Lab results found above were reviewed with the patient.    Plan:    1. Increase Imdur from 30 to 60 mg daily for chest pain. Patient was instructed to call the office if her chest pain worsens, at which time we will schedule a heart catheterization.  2. Continue aspirin 81 mg for CAD s/p stents.   3. Continue bisoprolol-HCT for hypertension.  4. Continue all other current medications.  5. F/up in 6 months or sooner if needed.    Scribed for Susan Goodwin MD by Annemarie Wadsworth. 8/14/2019  4:15  PM

## 2019-08-30 ENCOUNTER — TELEPHONE (OUTPATIENT)
Dept: CARDIOLOGY | Facility: CLINIC | Age: 84
End: 2019-08-30

## 2019-08-30 DIAGNOSIS — Z01.818 PREOPERATIVE CLEARANCE: Primary | ICD-10-CM

## 2019-08-30 DIAGNOSIS — I25.10 MULTIPLE VESSEL CORONARY ARTERY DISEASE: ICD-10-CM

## 2019-08-30 NOTE — TELEPHONE ENCOUNTER
PT is anticipating knee surgery- she will need a stress test for CC- will go ahead and place an order- she will let us know if she needs anything further

## 2019-09-26 ENCOUNTER — APPOINTMENT (OUTPATIENT)
Dept: CARDIOLOGY | Facility: HOSPITAL | Age: 84
End: 2019-09-26

## 2019-09-30 DIAGNOSIS — F41.8 ANXIETY WITH DEPRESSION: ICD-10-CM

## 2019-09-30 RX ORDER — CITALOPRAM 20 MG/1
20 TABLET ORAL DAILY
Qty: 30 TABLET | Refills: 0 | Status: SHIPPED | OUTPATIENT
Start: 2019-09-30

## 2019-10-07 ENCOUNTER — APPOINTMENT (OUTPATIENT)
Dept: CARDIOLOGY | Facility: HOSPITAL | Age: 84
End: 2019-10-07

## 2020-01-09 DIAGNOSIS — I25.10 ASCVD (ARTERIOSCLEROTIC CARDIOVASCULAR DISEASE): ICD-10-CM

## 2020-01-09 DIAGNOSIS — I10 ESSENTIAL HYPERTENSION: ICD-10-CM

## 2020-01-09 RX ORDER — ISOSORBIDE MONONITRATE 60 MG/1
60 TABLET, EXTENDED RELEASE ORAL DAILY
Qty: 90 TABLET | Refills: 0 | Status: SHIPPED | OUTPATIENT
Start: 2020-01-09 | End: 2020-04-14 | Stop reason: SDUPTHER

## 2020-01-09 RX ORDER — BISOPROLOL FUMARATE AND HYDROCHLOROTHIAZIDE 5; 6.25 MG/1; MG/1
1 TABLET ORAL DAILY
Qty: 90 TABLET | Refills: 0 | Status: SHIPPED | OUTPATIENT
Start: 2020-01-09 | End: 2020-03-20

## 2020-03-20 DIAGNOSIS — G25.81 RESTLESS LEG: ICD-10-CM

## 2020-03-20 DIAGNOSIS — I10 ESSENTIAL HYPERTENSION: ICD-10-CM

## 2020-03-20 RX ORDER — BISOPROLOL FUMARATE AND HYDROCHLOROTHIAZIDE 5; 6.25 MG/1; MG/1
TABLET ORAL
Qty: 90 TABLET | Refills: 0 | Status: SHIPPED | OUTPATIENT
Start: 2020-03-20 | End: 2020-04-14 | Stop reason: SDUPTHER

## 2020-03-20 RX ORDER — ROPINIROLE 0.25 MG/1
TABLET, FILM COATED ORAL
Qty: 90 TABLET | Refills: 1 | OUTPATIENT
Start: 2020-03-20

## 2020-04-14 ENCOUNTER — OFFICE VISIT (OUTPATIENT)
Dept: CARDIOLOGY | Facility: CLINIC | Age: 85
End: 2020-04-14

## 2020-04-14 VITALS — BODY MASS INDEX: 26.68 KG/M2 | HEIGHT: 62 IN | WEIGHT: 145 LBS

## 2020-04-14 DIAGNOSIS — I10 ESSENTIAL HYPERTENSION: ICD-10-CM

## 2020-04-14 DIAGNOSIS — I25.10 ASCVD (ARTERIOSCLEROTIC CARDIOVASCULAR DISEASE): ICD-10-CM

## 2020-04-14 DIAGNOSIS — E78.5 HYPERLIPIDEMIA, UNSPECIFIED HYPERLIPIDEMIA TYPE: Primary | ICD-10-CM

## 2020-04-14 DIAGNOSIS — I25.118 CORONARY ARTERY DISEASE OF NATIVE ARTERY OF NATIVE HEART WITH STABLE ANGINA PECTORIS (HCC): ICD-10-CM

## 2020-04-14 PROCEDURE — 99213 OFFICE O/P EST LOW 20 MIN: CPT | Performed by: NURSE PRACTITIONER

## 2020-04-14 RX ORDER — ISOSORBIDE MONONITRATE 60 MG/1
60 TABLET, EXTENDED RELEASE ORAL DAILY
Qty: 90 TABLET | Refills: 3 | Status: SHIPPED | OUTPATIENT
Start: 2020-04-14 | End: 2021-04-21

## 2020-04-14 RX ORDER — BISOPROLOL FUMARATE AND HYDROCHLOROTHIAZIDE 5; 6.25 MG/1; MG/1
1 TABLET ORAL DAILY
Qty: 90 TABLET | Refills: 3 | Status: SHIPPED | OUTPATIENT
Start: 2020-04-14 | End: 2021-04-21

## 2020-04-14 NOTE — PROGRESS NOTES
[facility]    Patient ID: Gaviota Evans is a 84 y.o. female.  : 1935   Contact: [phone]    Encounter date: 2020    PCP: Joselin Diaz APRN      Chief complaint:   Chief Complaint   Patient presents with   • Coronary artery disease of native artery of native heart wit       PROBLEM LIST:  1. Coronary artery disease:  a. [facility], 12/10/2007, Dr. Grewal: S/p 2.5 x 8 mm Micro  stent, 2.5 x 14 mm Micro  stent, and 2.5 x 15 mm Micro  stent placement to the LAD and OM1.    b. [facility], 2008, Dr. Manley: S/p 3.5 x 16 mm Liberté BMS to RCA.  c. [facility] Cardiolite 2010 Dr. Harris: Patient did not reach target heart rate secondary  to not holding beta blocker, negative for ischemia.  d. [facility], 2011, Dr. Goodwin: EF 55-60%. 3/3 patent stents. No territories appear to be a cause of ischemia. Zebeta changed to Diltiazem 120  mg per day.  e. Echocardiogram, 2011: Mild AI, trace MR, mild TR, EF 55-60%.  f. [facility], 2015, Dr. Cooney: 90% and 60% sequential stenosis in the OM reduced to 0 with a 2.5  x 23 mm and a 2.5 x 8 mm Xience Alpine DENISE. Non-flow-limiting disease of the LAD and diagonal branch (40%), 50% RCA, normal LVEF.  2. Palpitations:  a.  Event monitor, 2011: Revealed brief episodes of atrial tachycardia.   3. Hypertension.  4. Hyperlipidemia.  5. BUBBA with CPAP compliance.  6. Fibromyalgia.  7. Chronic back pain with intermittent epidural injections.  8. Lower extremity edema/discomfort:  a. Remote ROXANA:  right ROXANA 1.1, left ROXANA 1.1.  b. Bilateral lower extremity duplex, 3/27/2017: No significant right lower extremity atherosclerotic arterial disease. Mild left lower extremity atherosclerotic arterial disease.   9. Surgical history:  a. Cholecystectomy.  b. Vaginal repair.  c. Lumbar fusion, 2006.  d. Hysterectomy.       Allergies   Allergen Reactions   • Acyclovir Dizziness   • Statins    • Ambien [Zolpidem  Tartrate] Other (See Comments)     amnestic   • Arthrotec [Diclofenac-Misoprostol] GI Intolerance   • Aspirin GI Intolerance   • Augmentin [Amoxicillin-Pot Clavulanate] Unknown (See Comments)     unknown   • Bextra [Valdecoxib] GI Intolerance   • Ciprofloxacin    • Codeine GI Intolerance   • Crestor [Rosuvastatin] Other (See Comments)     Rxn not known   • Darvon [Propoxyphene] GI Intolerance   • Effient [Prasugrel] Other (See Comments)     Tongue swelling   • Gabapentin Delirium   • Lescol [Fluvastatin] Other (See Comments)     Rxn not known   • Lexapro [Escitalopram Oxalate] Nausea Only   • Lyrica [Pregabalin] GI Bleeding and GI Intolerance   • Macrobid [Nitrofurantoin] Other (See Comments)     Pain/cramping   • Percocet [Oxycodone-Acetaminophen] Mental Status Change     depression   • Plavix [Clopidogrel Bisulfate] Swelling     Tongue swelling   • Pravachol [Pravastatin Sodium] Other (See Comments)     Rxn not known   • Vioxx [Rofecoxib] GI Intolerance   • Welchol [Colesevelam Hcl] Other (See Comments)     Joint pain   • Sulfamethoxazole-Trimethoprim Rash       Current Medications:    Current Outpatient Medications:   •  aspirin 81 MG EC tablet, Take 81 mg by mouth daily., Disp: , Rfl:   •  bisoprolol-hydrochlorothiazide (ZIAC) 5-6.25 MG per tablet, Take 1 tablet by mouth Daily., Disp: 90 tablet, Rfl: 3  •  citalopram (CeleXA) 20 MG tablet, Take 1 tablet by mouth Daily. Must have an appointment for future refills, Disp: 30 tablet, Rfl: 0  •  Coenzyme Q10 (CO Q 10 PO), Take  by mouth Daily., Disp: , Rfl:   •  cyanocobalamin 1000 MCG/ML injection, Inject 1 mL into the appropriate muscle as directed by prescriber Every 14 (Fourteen) Days., Disp: 6 mL, Rfl: 2  •  isosorbide mononitrate (IMDUR) 60 MG 24 hr tablet, Take 1 tablet by mouth Daily., Disp: 90 tablet, Rfl: 3  •  lidocaine (LIDODERM) 5 %, Place 1 patch on the skin as directed by provider Daily. Remove & Discard patch within 12 hours or as directed by MD,  "Disp: 30 each, Rfl: 5  •  nitroglycerin (NITROSTAT) 0.4 MG SL tablet, Place 1 tablet under the tongue As Needed for Chest Pain. Prn chest pain, Disp: 25 tablet, Rfl: 5  •  pantoprazole (PROTONIX) 40 MG EC tablet, Take 1 tablet by mouth Daily., Disp: 90 tablet, Rfl: 0  •  prednisoLONE acetate (PRED FORTE) 1 % ophthalmic suspension, Administer 1 drop into the left eye 2 (two) times a week., Disp: , Rfl:   •  rOPINIRole (REQUIP) 0.25 MG tablet, Take 1 tablet by mouth Every Night. Take 1 hour before bedtime. (Patient taking differently: Take 0.25 mg by mouth As Needed. Take 1 hour before bedtime.), Disp: 60 tablet, Rfl: 2  •  simethicone (PHAZYME) 125 MG chewable tablet, Chew 1 tablet Every 6 (Six) Hours As Needed for Flatulence., Disp: 60 tablet, Rfl: 1  •  traMADol (ULTRAM) 50 MG tablet, Take 1 tablet by mouth Every 6 (Six) Hours As Needed for Moderate Pain ., Disp: 60 tablet, Rfl: 0  •  TURMERIC PO, Take  by mouth Daily., Disp: , Rfl:     HPI    Gaviota Evans is a 84 y.o. female who presents today for follow up on CAD, HTN, HLD. Since last visit, patient has done well overall. She has some lower extremity edema but unchanged. Occ. SOB, CP but not much improved on higher doses of Imdur. She does not want to increase this dose as of yet. Denies PND or orthopnea. She has chronic fatigue but is due for her VitB12 injection and also has fibromyalgia and chronic low back pain. She does not check her BP routinely.     The following portions of the patient's history were reviewed and updated as appropriate: allergies, current medications and problem list.    Pertinent positives as listed in the HPI.  All other systems reviewed are negative.       Vitals:    04/14/20 1308   Weight: 65.8 kg (145 lb)   Height: 157.5 cm (62\")       Physical Exam: AAOx3, Flat affect, mood congruent.       Diagnostic Data:  Lab Results   Component Value Date    GLUCOSE 77 08/22/2016    BUN 18 02/04/2019    CREATININE 1.14 02/04/2019    " EGFRIFNONA 46 (L) 02/04/2019    EGFRIFAFRI 55 (L) 02/04/2019    BCR 15.8 02/04/2019    K 4.4 02/04/2019    CO2 28.0 02/04/2019    CALCIUM 9.2 02/04/2019    PROTENTOTREF 6.6 02/04/2019    ALBUMIN 4.14 02/04/2019    LABIL2 1.7 02/04/2019    AST 20 02/04/2019    ALT 13 02/04/2019     Lab Results   Component Value Date    GLUCOSE 77 08/22/2016    CALCIUM 9.2 02/04/2019     02/04/2019    K 4.4 02/04/2019    CO2 28.0 02/04/2019    CL 99 02/04/2019    BUN 18 02/04/2019    CREATININE 1.14 02/04/2019    EGFRIFAFRI 55 (L) 02/04/2019    EGFRIFNONA 46 (L) 02/04/2019    BCR 15.8 02/04/2019    ANIONGAP 1.0 (L) 08/22/2016     Lab Results   Component Value Date    CHLPL 188 02/04/2019    TRIG 132 02/04/2019    HDL 48 02/04/2019     (H) 02/04/2019     Lab Results   Component Value Date    WBC 9.65 02/04/2019    HGB 12.7 02/04/2019    HCT 39.5 02/04/2019    MCV 86.1 02/04/2019     02/04/2019     Lab Results   Component Value Date    TSH 3.537 02/04/2019       Procedures    Advance Care Planning   ACP discussion was held with the patient during this visit. Patient does not have an advance directive, declines further assistance.       ASSESSMENT:    ICD-10-CM ICD-9-CM   1. Hyperlipidemia, unspecified hyperlipidemia type E78.5 272.4   2. Essential hypertension I10 401.9   3. ASCVD (arteriosclerotic cardiovascular disease) I25.10 429.2     440.9   4. Coronary artery disease of native artery of native heart with stable angina pectoris (CMS/[facility]) I25.118 414.01     413.9     Lab results found above were reviewed with the patient.      PLAN:  1. Continue ASA, statin for CAD.   2. Continue statin for HLD. Reviewed last FLP from 2/2019. Will send order to have labs repeat once COVID restrictions are lifted.   3. Continue bisoprolol/hctz for HTN. Discussed importance of checking BP routinely.   4. Continue Imdur for CAD  5. Continue all other current medications.  6. F/up in 6 months, sooner if needed.    Electronically  signed by DIANE Mcghee, 04/14/20, 1:20 PM.      This patient has consented to a telehealth visit via televisit. The visit was scheduled as a phone visit to comply with patient safety concerns in accordance with CDC recommendations.  All vitals recorded within this visit are reported by the patient.  I spent  30 minutes in total including but not limited to the 15 minutes spent in direct conversation with this patient.

## 2020-04-19 ENCOUNTER — RESULTS ENCOUNTER (OUTPATIENT)
Dept: CARDIOLOGY | Facility: CLINIC | Age: 85
End: 2020-04-19

## 2020-04-19 DIAGNOSIS — E78.5 HYPERLIPIDEMIA, UNSPECIFIED HYPERLIPIDEMIA TYPE: ICD-10-CM

## 2020-11-18 RX ORDER — EZETIMIBE 10 MG/1
10 TABLET ORAL DAILY
Qty: 30 TABLET | Refills: 3 | Status: SHIPPED | OUTPATIENT
Start: 2020-11-18 | End: 2021-05-20 | Stop reason: SDUPTHER

## 2021-01-01 NOTE — TELEPHONE ENCOUNTER
Mrs Evans had returned my call. She is continuing to have pain and limited mobility of her knee. It is diffucult to get in to an appointment and currently her son cannot easily take off of work to drive her. Would we be able to refer her to a specialist, hopefully someone that could make house calls without a visit? She requests a call back this afternoon.     She also requested that we let her know what was on her scan from the ER last week.    37

## 2021-04-20 DIAGNOSIS — I10 ESSENTIAL HYPERTENSION: ICD-10-CM

## 2021-04-20 DIAGNOSIS — I25.10 ASCVD (ARTERIOSCLEROTIC CARDIOVASCULAR DISEASE): ICD-10-CM

## 2021-04-21 RX ORDER — BISOPROLOL FUMARATE AND HYDROCHLOROTHIAZIDE 5; 6.25 MG/1; MG/1
TABLET ORAL
Qty: 90 TABLET | Refills: 1 | Status: SHIPPED | OUTPATIENT
Start: 2021-04-21 | End: 2022-01-14 | Stop reason: SDUPTHER

## 2021-04-21 RX ORDER — ISOSORBIDE MONONITRATE 60 MG/1
TABLET, EXTENDED RELEASE ORAL
Qty: 90 TABLET | Refills: 1 | Status: SHIPPED | OUTPATIENT
Start: 2021-04-21 | End: 2021-04-26

## 2021-04-24 DIAGNOSIS — I25.10 ASCVD (ARTERIOSCLEROTIC CARDIOVASCULAR DISEASE): ICD-10-CM

## 2021-04-26 RX ORDER — ISOSORBIDE MONONITRATE 60 MG/1
TABLET, EXTENDED RELEASE ORAL
Qty: 90 TABLET | Refills: 0 | Status: SHIPPED | OUTPATIENT
Start: 2021-04-26 | End: 2022-01-03 | Stop reason: SDUPTHER

## 2021-05-20 DIAGNOSIS — E78.5 HYPERLIPIDEMIA, UNSPECIFIED HYPERLIPIDEMIA TYPE: Primary | ICD-10-CM

## 2021-05-20 RX ORDER — EZETIMIBE 10 MG/1
10 TABLET ORAL DAILY
Qty: 90 TABLET | Refills: 0 | Status: SHIPPED | OUTPATIENT
Start: 2021-05-20 | End: 2021-07-21

## 2021-07-21 ENCOUNTER — OFFICE VISIT (OUTPATIENT)
Dept: CARDIOLOGY | Facility: CLINIC | Age: 86
End: 2021-07-21

## 2021-07-21 VITALS
BODY MASS INDEX: 26.31 KG/M2 | SYSTOLIC BLOOD PRESSURE: 122 MMHG | HEART RATE: 77 BPM | OXYGEN SATURATION: 94 % | HEIGHT: 62 IN | DIASTOLIC BLOOD PRESSURE: 52 MMHG | WEIGHT: 143 LBS

## 2021-07-21 DIAGNOSIS — I10 ESSENTIAL HYPERTENSION: ICD-10-CM

## 2021-07-21 DIAGNOSIS — I25.10 ASCVD (ARTERIOSCLEROTIC CARDIOVASCULAR DISEASE): Primary | ICD-10-CM

## 2021-07-21 DIAGNOSIS — E78.5 HYPERLIPIDEMIA, UNSPECIFIED HYPERLIPIDEMIA TYPE: ICD-10-CM

## 2021-07-21 DIAGNOSIS — I25.118 CORONARY ARTERY DISEASE OF NATIVE ARTERY OF NATIVE HEART WITH STABLE ANGINA PECTORIS (HCC): ICD-10-CM

## 2021-07-21 PROCEDURE — 99213 OFFICE O/P EST LOW 20 MIN: CPT | Performed by: INTERNAL MEDICINE

## 2021-07-21 RX ORDER — LANOLIN ALCOHOL/MO/W.PET/CERES
50 CREAM (GRAM) TOPICAL DAILY
COMMUNITY

## 2021-07-21 NOTE — PROGRESS NOTES
[facility]    Patient ID: Gaviota Evans is a 85 y.o. female.  : 1935   Contact: [phone]    Encounter date: 2021    PCP: Deven De La Cruz DO      Chief complaint:   Chief Complaint   Patient presents with   • Hyperlipidemia, unspecified hyperlipidemia type     Problem List:  1. Coronary artery disease:  a. [facility], 12/10/2007, Dr. Grewal: S/p 2.5 x 8 mm Micro  stent, 2.5 x 14 mm Micro  stent, and 2.5 x 15 mm Micro  stent placement to the LAD and OM1.    b. [facility], 2008, Dr. Manley: S/p 3.5 x 16 mm Liberté BMS to RCA.  c. [facility] Cardiolite 2010 Dr. Harris: Patient did not reach target heart rate secondary  to not holding beta blocker, negative for ischemia.  d. [facility], 2011, Dr. Goodwin: EF 55-60%. 3/3 patent stents. No territories appear to be a cause of ischemia. Zebeta changed to Diltiazem 120  mg per day.  e. Echocardiogram, 2011: Mild AI, trace MR, mild TR, EF 55-60%.  f. [facility], 2015, Dr. Cooney: 90% and 60% sequential stenosis in the OM reduced to 0 with a 2.5  x 23 mm and a 2.5 x 8 mm Xience Alpine DENISE. Non-flow-limiting disease of the LAD and diagonal branch (40%), 50% RCA, normal LVEF.  2. Palpitations:  a.  Event monitor, 2011: Revealed brief episodes of atrial tachycardia.   3. Hypertension.  4. Hyperlipidemia.  5. BUBBA with CPAP compliance.  6. Fibromyalgia.  7. Chronic back pain with intermittent epidural injections.  8. Lower extremity edema/discomfort:  a. Remote ROXANA:  right ROXANA 1.1, left ROXANA 1.1.  b. Bilateral lower extremity duplex, 3/27/2017: No significant right lower extremity atherosclerotic arterial disease. Mild left lower extremity atherosclerotic arterial disease.   9. Surgical history:  a. Cholecystectomy.  b. Vaginal repair.  c. Lumbar fusion, 2006.  d. Hysterectomy.    Allergies   Allergen Reactions   • Acyclovir Dizziness   • Statins    • Ambien [Zolpidem Tartrate] Other (See  Comments)     amnestic   • Arthrotec [Diclofenac-Misoprostol] GI Intolerance   • Aspirin GI Intolerance   • Augmentin [Amoxicillin-Pot Clavulanate] Unknown (See Comments)     unknown   • Bextra [Valdecoxib] GI Intolerance   • Ciprofloxacin    • Codeine GI Intolerance   • Crestor [Rosuvastatin] Other (See Comments)     Rxn not known   • Darvon [Propoxyphene] GI Intolerance   • Effient [Prasugrel] Other (See Comments)     Tongue swelling   • Gabapentin Delirium   • Lescol [Fluvastatin] Other (See Comments)     Rxn not known   • Lexapro [Escitalopram Oxalate] Nausea Only   • Lyrica [Pregabalin] GI Bleeding and GI Intolerance   • Macrobid [Nitrofurantoin] Other (See Comments)     Pain/cramping   • Percocet [Oxycodone-Acetaminophen] Mental Status Change     depression   • Plavix [Clopidogrel Bisulfate] Swelling     Tongue swelling   • Pravachol [Pravastatin Sodium] Other (See Comments)     Rxn not known   • Vioxx [Rofecoxib] GI Intolerance   • Welchol [Colesevelam Hcl] Other (See Comments)     Joint pain   • Zetia [Ezetimibe] Irritability   • Sulfamethoxazole-Trimethoprim Rash       Current Medications:    Current Outpatient Medications:   •  aspirin 81 MG EC tablet, Take 81 mg by mouth daily., Disp: , Rfl:   •  bisoprolol-hydrochlorothiazide (ZIAC) 5-6.25 MG per tablet, TAKE ONE TABLET BY MOUTH DAILY, Disp: 90 tablet, Rfl: 1  •  citalopram (CeleXA) 20 MG tablet, Take 1 tablet by mouth Daily. Must have an appointment for future refills, Disp: 30 tablet, Rfl: 0  •  Coenzyme Q10 (CO Q 10 PO), Take  by mouth Daily., Disp: , Rfl:   •  cyanocobalamin 1000 MCG/ML injection, Inject 1 mL into the appropriate muscle as directed by prescriber Every 14 (Fourteen) Days., Disp: 6 mL, Rfl: 2  •  isosorbide mononitrate (IMDUR) 60 MG 24 hr tablet, TAKE ONE TABLET BY MOUTH DAILY, Disp: 90 tablet, Rfl: 0  •  lidocaine (LIDODERM) 5 %, Place 1 patch on the skin as directed by provider Daily. Remove & Discard patch within 12 hours or as  "directed by MD, Disp: 30 each, Rfl: 5  •  pantoprazole (PROTONIX) 40 MG EC tablet, Take 1 tablet by mouth Daily., Disp: 90 tablet, Rfl: 0  •  prednisoLONE acetate (PRED FORTE) 1 % ophthalmic suspension, Administer 1 drop into the left eye 2 (two) times a week., Disp: , Rfl:   •  rOPINIRole (REQUIP) 0.25 MG tablet, Take 1 tablet by mouth Every Night. Take 1 hour before bedtime. (Patient taking differently: Take 0.25 mg by mouth As Needed. Take 1 hour before bedtime.), Disp: 60 tablet, Rfl: 2  •  traMADol (ULTRAM) 50 MG tablet, Take 1 tablet by mouth Every 6 (Six) Hours As Needed for Moderate Pain ., Disp: 60 tablet, Rfl: 0  •  vitamin B-6 (PYRIDOXINE) 50 MG tablet, Take 50 mg by mouth Daily., Disp: , Rfl:     HPI    Gaviota Evans is a 85 y.o. female who presents today for CAD, s/p stents, PVCs, Atach, HTN and HLD. Since last visit, patient has done well overall. Denies chest pain, WOLFE, PND, orthopnea, HAILEY, unexplained weight gain. BP controlled. No palpitations. Has not had labs checked since starting Zetia.        The following portions of the patient's history were reviewed and updated as appropriate: allergies, current medications and problem list.    Pertinent positives as listed in the HPI.  All other systems reviewed are negative.       Vitals:    07/21/21 1617   BP: 122/52   BP Location: Left arm   Patient Position: Sitting   Pulse: 77   SpO2: 94%   Weight: 64.9 kg (143 lb)   Height: 157.5 cm (62\")       Physical Exam:  General: Alert and oriented.  Neck: Jugular venous pressure is within normal limits. Carotids have normal upstrokes without bruits.   Cardiovascular: Heart has a nondisplaced focal PMI. Regular rate and rhythm. No murmur, gallop or rub.  Lungs: Clear, no rales or wheezes. Equal expansion is noted.   Extremities: Show no edema.  Skin: Warm and dry.  Neurologic: Nonfocal.     Diagnostic Data (reviewed with patient):    Lab date: 10/19/2020  • FLP: , , HDL 39,   • CMP: Glu 95, " BUN 15, Creat 0.90, eGFR >60, Na 140, K 3.8, Cl 102, CO2 29, Ca 9.7, Alk Phos 80, AST 26, ALT 13     Procedures      Assessment:    ICD-10-CM ICD-9-CM   1. ASCVD (arteriosclerotic cardiovascular disease)  I25.10 429.2     440.9   2. Coronary artery disease of native artery of native heart with stable angina pectoris (CMS/HCC)  I25.118 414.01     413.9   3. Essential hypertension  I10 401.9   4. Hyperlipidemia, unspecified hyperlipidemia type  E78.5 272.4         Plan:  1. Check FLP and CMP.   2. Continue on aspirin 81 mg for antiplatelet therapy, Imdur 60 daily for chest pain, and nitroglycerin 0.4 mg as needed.   3. Continue on Ziac 5-6.25 mg for hypertension.   4. Continue on Zetia 10 mg daily for hyperlipidemia.   5. Continue all other current medications.  6. F/up in 8 months, sooner if needed.      Electronically signed by DIANE Mcghee, 07/21/21, 4:32 PM EDT.

## 2022-01-03 DIAGNOSIS — I25.10 ASCVD (ARTERIOSCLEROTIC CARDIOVASCULAR DISEASE): ICD-10-CM

## 2022-01-03 RX ORDER — ISOSORBIDE MONONITRATE 60 MG/1
60 TABLET, EXTENDED RELEASE ORAL DAILY
Qty: 90 TABLET | Refills: 3 | Status: SHIPPED | OUTPATIENT
Start: 2022-01-03

## 2022-01-14 DIAGNOSIS — I10 ESSENTIAL HYPERTENSION: ICD-10-CM

## 2022-01-14 RX ORDER — BISOPROLOL FUMARATE AND HYDROCHLOROTHIAZIDE 5; 6.25 MG/1; MG/1
1 TABLET ORAL DAILY
Qty: 90 TABLET | Refills: 0 | Status: SHIPPED | OUTPATIENT
Start: 2022-01-14 | End: 2022-07-22

## 2022-07-22 DIAGNOSIS — I10 ESSENTIAL HYPERTENSION: ICD-10-CM

## 2022-07-22 RX ORDER — BISOPROLOL FUMARATE AND HYDROCHLOROTHIAZIDE 5; 6.25 MG/1; MG/1
1 TABLET ORAL DAILY
Qty: 90 TABLET | Refills: 0 | Status: SHIPPED | OUTPATIENT
Start: 2022-07-22

## 2023-06-05 DIAGNOSIS — I25.10 ASCVD (ARTERIOSCLEROTIC CARDIOVASCULAR DISEASE): ICD-10-CM

## 2023-06-05 RX ORDER — ISOSORBIDE MONONITRATE 60 MG/1
TABLET, EXTENDED RELEASE ORAL
Qty: 90 TABLET | Refills: 3 | Status: SHIPPED | OUTPATIENT
Start: 2023-06-05

## 2024-05-25 DIAGNOSIS — I10 ESSENTIAL HYPERTENSION: ICD-10-CM

## 2024-05-28 RX ORDER — BISOPROLOL FUMARATE AND HYDROCHLOROTHIAZIDE 5; 6.25 MG/1; MG/1
TABLET ORAL
Qty: 90 TABLET | Refills: 3 | Status: SHIPPED | OUTPATIENT
Start: 2024-05-28

## 2024-09-19 DIAGNOSIS — I25.10 ASCVD (ARTERIOSCLEROTIC CARDIOVASCULAR DISEASE): ICD-10-CM

## 2024-09-19 RX ORDER — ISOSORBIDE MONONITRATE 60 MG/1
90 TABLET, EXTENDED RELEASE ORAL DAILY
Qty: 135 TABLET | Refills: 3 | Status: SHIPPED | OUTPATIENT
Start: 2024-09-19

## 2024-10-10 ENCOUNTER — OFFICE VISIT (OUTPATIENT)
Dept: CARDIOLOGY | Facility: CLINIC | Age: 89
End: 2024-10-10
Payer: MEDICARE

## 2024-10-10 VITALS
SYSTOLIC BLOOD PRESSURE: 102 MMHG | BODY MASS INDEX: 27.29 KG/M2 | HEART RATE: 76 BPM | HEIGHT: 60 IN | DIASTOLIC BLOOD PRESSURE: 56 MMHG | WEIGHT: 139 LBS | OXYGEN SATURATION: 95 %

## 2024-10-10 DIAGNOSIS — I10 ESSENTIAL HYPERTENSION: ICD-10-CM

## 2024-10-10 DIAGNOSIS — G47.33 OSA (OBSTRUCTIVE SLEEP APNEA): Primary | ICD-10-CM

## 2024-10-10 DIAGNOSIS — E78.2 MIXED HYPERLIPIDEMIA: ICD-10-CM

## 2024-10-10 DIAGNOSIS — I25.10 CORONARY ARTERY DISEASE INVOLVING NATIVE CORONARY ARTERY OF NATIVE HEART WITHOUT ANGINA PECTORIS: Primary | ICD-10-CM

## 2024-10-10 DIAGNOSIS — I25.10 ASCVD (ARTERIOSCLEROTIC CARDIOVASCULAR DISEASE): ICD-10-CM

## 2024-10-10 PROCEDURE — 1159F MED LIST DOCD IN RCRD: CPT | Performed by: INTERNAL MEDICINE

## 2024-10-10 PROCEDURE — 1160F RVW MEDS BY RX/DR IN RCRD: CPT | Performed by: INTERNAL MEDICINE

## 2024-10-10 PROCEDURE — 99214 OFFICE O/P EST MOD 30 MIN: CPT | Performed by: INTERNAL MEDICINE

## 2024-10-10 RX ORDER — BISOPROLOL FUMARATE 5 MG/1
5 TABLET, FILM COATED ORAL DAILY
Qty: 90 TABLET | Refills: 3 | Status: SHIPPED | OUTPATIENT
Start: 2024-10-10

## 2024-10-10 RX ORDER — ISOSORBIDE MONONITRATE 60 MG/1
60 TABLET, EXTENDED RELEASE ORAL DAILY
Qty: 90 TABLET | Refills: 3 | Status: SHIPPED | OUTPATIENT
Start: 2024-10-10

## 2024-10-10 NOTE — PROGRESS NOTES
[facility] Cardiology    Patient ID: Gaviota Evans is a 89 y.o. female.  : 1935   Contact: [phone]    Encounter date: 10/10/2024    PCP: Provider, No Known      Chief complaint:   Chief Complaint   Patient presents with    ASCVD       Problem List:  Coronary artery disease:  [facility], 12/10/2007, Dr. Grewal: S/p 2.5 x 8 mm Micro  stent, 2.5 x 14 mm Micro  stent, and 2.5 x 15 mm Micro  stent placement to the LAD and OM1.    [facility], 2008, Dr. Manley: S/p 3.5 x 16 mm Liberté BMS to RCA.  T Cardiolite 2010 Dr. Harris: Patient did not reach target heart rate secondary  to not holding beta blocker, negative for ischemia.  [facility], 2011, Dr. Goodwin: EF 55-60%. 3/3 patent stents. No territories appear to be a cause of ischemia. Zebeta changed to Diltiazem 120  mg per day.  Echocardiogram, 2011: Mild AI, trace MR, mild TR, EF 55-60%.  [facility], 2015, Dr. Cooney: 90% and 60% sequential stenosis in the OM reduced to 0 with a 2.5  x 23 mm and a 2.5 x 8 mm Xience [place]pine DENISE. Non-flow-limiting disease of the LAD and diagonal branch (40%), 50% RCA, normal LVEF.  Palpitations:   Event monitor, 2011: Revealed brief episodes of atrial tachycardia.   Hypertension.  Hyperlipidemia.  BUBBA with CPAP non-compliance.  Fibromyalgia.  Chronic back pain with intermittent epidural injections.  Lower extremity edema/discomfort:  Remote ROXANA:  right ROXANA 1.1, left ROXANA 1.1.  Bilateral lower extremity duplex, 3/27/2017: No significant right lower extremity atherosclerotic arterial disease. Mild left lower extremity atherosclerotic arterial disease.   Surgical history:  Cholecystectomy.  Vaginal repair.  Lumbar fusion, 2006.  Hysterectomy.    Allergies   Allergen Reactions    Acyclovir Dizziness    Statins     Ambien [Zolpidem Tartrate] Other (See Comments)     amnestic    Arthrotec [Diclofenac-Misoprostol] GI Intolerance    Aspirin GI Intolerance     Augmentin [Amoxicillin-Pot Clavulanate] Unknown (See Comments)     unknown    Bextra [Valdecoxib] GI Intolerance    Ciprofloxacin     Codeine GI Intolerance    Crestor [Rosuvastatin] Other (See Comments)     Rxn not known    Darvon [Propoxyphene] GI Intolerance    Effient [Prasugrel] Other (See Comments)     Tongue swelling    Gabapentin Delirium    Lescol [Fluvastatin] Other (See Comments)     Rxn not known    Lexapro [Escitalopram Oxalate] Nausea Only    Lyrica [Pregabalin] GI Bleeding and GI Intolerance    Macrobid [Nitrofurantoin] Other (See Comments)     Pain/cramping    Percocet [Oxycodone-Acetaminophen] Mental Status Change     depression    Plavix [Clopidogrel Bisulfate] Swelling     Tongue swelling    Pravachol [Pravastatin Sodium] Other (See Comments)     Rxn not known    Vioxx [Rofecoxib] GI Intolerance    Welchol [Colesevelam Hcl] Other (See Comments)     Joint pain    Zetia [Ezetimibe] Irritability    Sulfamethoxazole-Trimethoprim Rash       Current Medications:    Current Outpatient Medications:     aspirin 81 MG EC tablet, Take 1 tablet by mouth Daily., Disp: , Rfl:     citalopram (CeleXA) 20 MG tablet, Take 1 tablet by mouth Daily. Must have an appointment for future refills (Patient taking differently: Take 2 tablets by mouth Daily. Must have an appointment for future refills), Disp: 30 tablet, Rfl: 0    cyanocobalamin 1000 MCG/ML injection, Inject 1 mL into the appropriate muscle as directed by prescriber Every 14 (Fourteen) Days., Disp: 6 mL, Rfl: 2    ferrous sulfate 325 (65 FE) MG tablet, Take 1 tablet by mouth Daily With Breakfast., Disp: , Rfl:     isosorbide mononitrate (IMDUR) 60 MG 24 hr tablet, TAKE 1.5 TABLETS BY MOUTH EVERY DAY, Disp: 135 tablet, Rfl: 3    lidocaine (LIDODERM) 5 %, Place 1 patch on the skin as directed by provider Daily. Remove & Discard patch within 12 hours or as directed by MD, Disp: 30 each, Rfl: 5    meloxicam (MOBIC) 7.5 MG tablet, Take 1 tablet by mouth As  "Needed., Disp: , Rfl:     pantoprazole (PROTONIX) 40 MG EC tablet, Take 1 tablet by mouth Daily., Disp: 90 tablet, Rfl: 0    prednisoLONE acetate (PRED FORTE) 1 % ophthalmic suspension, Administer 1 drop into the left eye 2 (Two) Times a Week., Disp: , Rfl:     rOPINIRole (REQUIP) 0.25 MG tablet, Take 1 tablet by mouth Every Night. Take 1 hour before bedtime. (Patient taking differently: Take 1 tablet by mouth As Needed. Take 1 hour before bedtime.), Disp: 60 tablet, Rfl: 2    traMADol (ULTRAM) 50 MG tablet, Take 1 tablet by mouth Every 6 (Six) Hours As Needed for Moderate Pain ., Disp: 60 tablet, Rfl: 0    Coenzyme Q10 (CO Q 10 PO), Take  by mouth Daily. (Patient not taking: Reported on 7/5/2023), Disp: , Rfl:     vitamin B-6 (PYRIDOXINE) 50 MG tablet, Take 50 mg by mouth Daily. (Patient not taking: Reported on 7/5/2023), Disp: , Rfl:     HPI    Gaviota Evans is a 89 y.o. female who presents today for a follow up of CAD and cardiac risk factors. Since last visit, patient has been doing well overall from a cardiovascular standpoint. She notes being fatigued until about noon every day. Patient does not use a CPAP machine for her diagnosed sleep apnea. 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 Patient notes fatigue and shortness of breath with activity such as showering. She has urinary urgency and often doesn't make it to the bathroom. Patient denies chest pain, orthopnea, palpitations, edema, dizziness, and syncope.       The following portions of the patient's history were reviewed and updated as appropriate: allergies, current medications and problem list.    Pertinent positives as listed in the HPI.  All other systems reviewed are negative.         Vitals:    10/10/24 1423 10/10/24 1438   BP: 98/52 102/56   BP Location: Left arm    Patient Position: Sitting    Pulse: 76    SpO2: 95%    Weight: 63 kg (139 lb)    Height: 152.4 cm (60\")        Physical Exam:  General: Alert and " oriented.  Neck: Jugular venous pressure is within normal limits. Carotids have normal upstrokes without bruits.   Cardiovascular: Heart has a nondisplaced focal PMI. Regular rate and rhythm. No murmur, gallop or rub.  Lungs: Clear, no rales or wheezes. Equal expansion is noted.   Extremities: Show no edema.  Skin: Warm and dry.  Neurologic: Nonfocal.     Diagnostic Data (reviewed with patient):  No recent laboratory studies available for review today.    Advance Care Planning   ACP discussion was held with the patient during this visit. Patient does not have an advance directive, declines further assistance.         Procedures      Assessment:    ICD-10-CM ICD-9-CM   1. Coronary artery disease involving native coronary artery of native heart without angina pectoris  I25.10 414.01   2. Essential hypertension  I10 401.9   3. Mixed hyperlipidemia  E78.2 272.2   4. ASCVD (arteriosclerotic cardiovascular disease)  I25.10 429.2     440.9         Plan:  Referral to pulmonology to further assess BUBBA as she does not currently have a CPAP machine.   Discontinue bisoprolol HCTZ 5-6.25 mg daily.   Start on bisoprolol 5 mg daily for rate control and hypertension.   Decrease Imdur to 60 mg daily.   Continue on aspirin 81 mg for antiplatelet therapy.   Continue all other current medications.  F/up in 6 months, sooner if needed.      Scribed for Susan Goodwin MD by Hermes Carmona. 10/10/2024 14:35 EDT    I Susan Goodwin MD personally performed the services described in this documentation as scribed by the above individual in my presence, and it is both accurate and complete.    Susan Goodwin MD, FACC